# Patient Record
Sex: MALE | Race: WHITE | Employment: OTHER | URBAN - METROPOLITAN AREA
[De-identification: names, ages, dates, MRNs, and addresses within clinical notes are randomized per-mention and may not be internally consistent; named-entity substitution may affect disease eponyms.]

---

## 2017-04-25 ENCOUNTER — OFFICE VISIT (OUTPATIENT)
Dept: INTERNAL MEDICINE CLINIC | Age: 67
End: 2017-04-25

## 2017-04-25 VITALS
RESPIRATION RATE: 25 BRPM | WEIGHT: 183 LBS | HEIGHT: 69 IN | HEART RATE: 72 BPM | OXYGEN SATURATION: 97 % | TEMPERATURE: 97.9 F | SYSTOLIC BLOOD PRESSURE: 137 MMHG | BODY MASS INDEX: 27.11 KG/M2 | DIASTOLIC BLOOD PRESSURE: 77 MMHG

## 2017-04-25 DIAGNOSIS — Z00.00 WELL ADULT HEALTH CHECK: ICD-10-CM

## 2017-04-25 DIAGNOSIS — K27.9 PUD (PEPTIC ULCER DISEASE): ICD-10-CM

## 2017-04-25 DIAGNOSIS — Z12.5 SCREENING FOR PROSTATE CANCER: ICD-10-CM

## 2017-04-25 DIAGNOSIS — I70.213 ATHEROSCLEROSIS OF NATIVE ARTERY OF BOTH LOWER EXTREMITIES WITH INTERMITTENT CLAUDICATION (HCC): ICD-10-CM

## 2017-04-25 DIAGNOSIS — I10 ESSENTIAL HYPERTENSION: Primary | ICD-10-CM

## 2017-04-25 DIAGNOSIS — L40.9 PSORIASIS: ICD-10-CM

## 2017-04-25 DIAGNOSIS — Z12.11 SCREENING FOR COLON CANCER: ICD-10-CM

## 2017-04-25 DIAGNOSIS — J30.89 ENVIRONMENTAL AND SEASONAL ALLERGIES: ICD-10-CM

## 2017-04-25 DIAGNOSIS — Z76.89 ENCOUNTER TO ESTABLISH CARE: ICD-10-CM

## 2017-04-25 RX ORDER — PROPRANOLOL HYDROCHLORIDE 80 MG/1
80 TABLET ORAL 2 TIMES DAILY
COMMUNITY
End: 2017-04-25 | Stop reason: SDUPTHER

## 2017-04-25 RX ORDER — AZELASTINE HCL 205.5 UG/1
SPRAY NASAL 2 TIMES DAILY
COMMUNITY
End: 2017-04-25 | Stop reason: SDUPTHER

## 2017-04-25 RX ORDER — PROPRANOLOL HYDROCHLORIDE 40 MG/1
40 TABLET ORAL 2 TIMES DAILY
Qty: 60 TAB | Refills: 2 | Status: SHIPPED | OUTPATIENT
Start: 2017-04-25 | End: 2017-07-27 | Stop reason: SDUPTHER

## 2017-04-25 RX ORDER — CLOBETASOL PROPIONATE 0.5 MG/G
OINTMENT TOPICAL 2 TIMES DAILY
COMMUNITY
End: 2017-04-25 | Stop reason: SDUPTHER

## 2017-04-25 RX ORDER — AZELASTINE HCL 205.5 UG/1
1-2 SPRAY NASAL 2 TIMES DAILY
Qty: 1 BOTTLE | Refills: 5 | Status: SHIPPED | OUTPATIENT
Start: 2017-04-25 | End: 2017-06-16 | Stop reason: CLARIF

## 2017-04-25 RX ORDER — CLOBETASOL PROPIONATE 0.5 MG/G
OINTMENT TOPICAL
Qty: 30 G | Refills: 1 | Status: SHIPPED | OUTPATIENT
Start: 2017-04-25 | End: 2017-06-22 | Stop reason: SDUPTHER

## 2017-04-25 RX ORDER — PROPRANOLOL HYDROCHLORIDE 80 MG/1
80 TABLET ORAL 2 TIMES DAILY
Qty: 60 TAB | Refills: 2 | Status: SHIPPED | OUTPATIENT
Start: 2017-04-25 | End: 2017-07-27 | Stop reason: SDUPTHER

## 2017-04-25 RX ORDER — PANTOPRAZOLE SODIUM 40 MG/1
40 TABLET, DELAYED RELEASE ORAL DAILY
COMMUNITY
End: 2017-04-25 | Stop reason: SDUPTHER

## 2017-04-25 RX ORDER — PANTOPRAZOLE SODIUM 40 MG/1
40 TABLET, DELAYED RELEASE ORAL DAILY
Qty: 30 TAB | Refills: 2 | Status: SHIPPED | OUTPATIENT
Start: 2017-04-25 | End: 2017-07-27 | Stop reason: SDUPTHER

## 2017-04-25 NOTE — PATIENT INSTRUCTIONS
When you see GI, see if they can do colonoscopy for screening if they need to do upper endoscopy for history of ulcers, at the same time. Referrals processing  Please verify with your insurance IF you need referral authorization submitted. For insurance plans which require this, please follow the following steps. FAILURE TO DO SO MAY RESULT IN INABILITY TO SEE THE SPECIALIST YOU HAVE BEEN REFERRED TO.   1. Call and schedule appointment with specialist  2. Call our clinic and leave message with provider name, and date of appointment  3. We will then submit the referral to your insurance. This process takes 2-5 business days. Please let Hany Lee or Donya Rawls know once you have scheduled the appt. You can use one of the following Over The Counter (OTC) anti-histamines such as Zyrtec (cetirizine 10mg), Claritin (loratadine 10mg) or Allegra (fexofenadine 180mg) or generics. You would take one tab daily as needed for allergy symptoms, itching. If the dermatologist cannot see you with your insurance, call [facility] or [address] [place] [address] Dermatology or your insurance to find covered providers.

## 2017-04-25 NOTE — MR AVS SNAPSHOT
Visit Information Date & Time Provider Department Dept. Phone Encounter #  
 4/25/2017 10:30 AM Josephine Arana, [address] [place] [address] and Internal Medicine [phone] [id number] Follow-up Instructions Return in about 2 weeks (around 5/9/2017) for Blood Pressure follow-up; fasting labs tomorrow (with insurance card). Upcoming Health Maintenance Date Due DTaP/Tdap/Td series (1 - Tdap) 5/17/1969 FOBT Q 1 YEAR AGE 50-75 5/17/1998 ZOSTER VACCINE AGE 60> 5/17/2008 GLAUCOMA SCREENING Q2Y 5/17/2013 Pneumococcal 65+ Low/Medium Risk (1 of 2 - PCV13) 5/17/2013 MEDICARE YEARLY EXAM 5/17/2013 INFLUENZA AGE 9 TO ADULT 8/1/2016 Allergies as of 4/25/2017  Review Complete On: 4/25/2017 By: Josephine Arana MD  
 No Known Allergies Current Immunizations  Never Reviewed No immunizations on file. Not reviewed this visit You Were Diagnosed With   
  
 Codes Comments Essential hypertension    -  Primary ICD-10-CM: I10 
ICD-9-CM: 401.9 Screening for prostate cancer     ICD-10-CM: Z12.5 ICD-9-CM: V76.44 Well adult health check     ICD-10-CM: Z00.00 ICD-9-CM: V70.0 Encounter to establish care     ICD-10-CM: Z76.89 
ICD-9-CM: V65.8 PUD (peptic ulcer disease)     ICD-10-CM: K27.9 ICD-9-CM: 533.90 Screening for colon cancer     ICD-10-CM: Z12.11 ICD-9-CM: V76.51 Environmental and seasonal allergies     ICD-10-CM: J30.89 ICD-9-CM: 477.8 Psoriasis     ICD-10-CM: L40.9 ICD-9-CM: 696.1 Atherosclerosis of native artery of both lower extremities with intermittent claudication ([facility] [address]     ICD-10-CM: S46.067 ICD-9-CM: 440.21 Vitals BP Pulse Temp Resp Height(growth percentile) Weight(growth percentile) 154/83 (BP 1 Location: Left arm, BP Patient Position: Sitting) 72 97.9 °F (36.6 °C) (Oral) 25 5' 8.5\" (1.74 m) 183 lb (83 kg) SpO2 BMI Smoking Status 97% 27.42 kg/m2 Heavy Tobacco Smoker BMI and BSA Data Body Mass Index Body Surface Area  
 27.42 kg/m 2 2 m 2 Preferred Pharmacy Pharmacy Name Phone [facility]/PHARMACY #[id number] Artur SWETHA Blackwood/ Matthew Wong [facility] [phone] Your Updated Medication List  
  
   
This list is accurate as of: 4/25/17 11:55 AM.  Always use your most recent med list.  
  
  
  
  
 Azelastine 0.15 % (205.5 mcg) nasal spray Commonly known as:  ASTEPRO  
1-2 Sprays by Both Nostrils route two (2) times a day. clobetasol 0.05 % ointment Commonly known as:  Jessee Limes Apply  to affected area two (2) times daily as needed for Skin Irritation. pantoprazole 40 mg tablet Commonly known as:  PROTONIX Take 1 Tab by mouth daily. * propranolol 80 mg tablet Commonly known as:  INDERAL Take 1 Tab by mouth two (2) times a day. Take with 40mg tab, for 120mg two times daily. * propranolol 40 mg tablet Commonly known as:  INDERAL Take 1 Tab by mouth two (2) times a day. Take with 80mg tab, for 120mg two times daily. * Notice: This list has 2 medication(s) that are the same as other medications prescribed for you. Read the directions carefully, and ask your doctor or other care provider to review them with you. Prescriptions Sent to Pharmacy Refills  
 propranolol (INDERAL) 80 mg tablet 2 Sig: Take 1 Tab by mouth two (2) times a day. Take with 40mg tab, for 120mg two times daily. Class: Normal  
 Pharmacy: [facility]/pharmacy #[id number]  [address] Ph #: [phone] Route: Oral  
 propranolol (INDERAL) 40 mg tablet 2 Sig: Take 1 Tab by mouth two (2) times a day. Take with 80mg tab, for 120mg two times daily. Class: Normal  
 Pharmacy: [facility]/pharmacy #[id number]  [address] Ph #: [phone] Route: Oral  
 pantoprazole (PROTONIX) 40 mg tablet 2 Sig: Take 1 Tab by mouth daily. Class: Normal  
 Pharmacy: [facility]/pharmacy #[id number] Robert  Ph #: [phone] Route: Oral  
 Azelastine (ASTEPRO) 0.15 % (205.5 mcg) nasal spray 5 Si-2 Sprays by Both Nostrils route two (2) times a day. Class: Normal  
 Pharmacy: [facility]/pharmacy #[id number] Robert  Ph #: [phone] Route: Both Nostrils  
 clobetasol (TEMOVATE) 0.05 % ointment 1 Sig: Apply  to affected area two (2) times daily as needed for Skin Irritation. Class: Normal  
 Pharmacy: [facility]/pharmacy #[id number] - [address] Ph #: [phone] Route: Topical  
  
We Performed the Following CBC WITH AUTOMATED DIFF [05143 CPT(R)] HEMOGLOBIN A1C WITH EAG [75896 CPT(R)] LIPID PANEL [36652 CPT(R)] METABOLIC PANEL, COMPREHENSIVE [50203 CPT(R)] PSA W/ REFLX FREE PSA [22273 CPT(R)] REFERRAL TO DERMATOLOGY [REF19 Custom] Comments:  
 Please evaluate patient for psoriasis. REFERRAL TO GASTROENTEROLOGY [NKQ46 Custom] Comments:  
 Please evaluate patient for PUD, repeat endoscopy and screening colonoscopy. REFERRAL TO VASCULAR SURGERY [[id number] Custom] Comments:  
 Please evaluate patient for bilat LE claudication. Follow-up Instructions Return in about 2 weeks (around 2017) for Blood Pressure follow-up; fasting labs tomorrow (with insurance card). Referral Information Referral ID Referred By Referred To  
  
 [id number] Maris PALOMINO MD   
   [address] [place] [address] [address] [facility], [address] Phone: [phone] Fax: [phone] Visits Status Start Date End Date 1 New Request 17 If your referral has a status of pending review or denied, additional information will be sent to support the outcome of this decision. Referral ID Referred By Referred To  
 [id number] Vania Deluna MD  
   [address] Phone: [phone] Fax: [phone] Visits Status Start Date End Date 1 New Request 4/25/17 4/25/18 If your referral has a status of pending review or denied, additional information will be sent to support the outcome of this decision. Referral ID Referred By Referred To  
 [id number] Shira Cole MD  
   [address] [place] [address] 3 [address] Phone: [phone] Fax: [phone] Visits Status Start Date End Date 1 New Request 4/25/17 4/25/18 If your referral has a status of pending review or denied, additional information will be sent to support the outcome of this decision. Patient Instructions When you see GI, see if they can do colonoscopy for screening if they need to do upper endoscopy for history of ulcers, at the same time. Referrals processing Please verify with your insurance IF you need referral authorization submitted. For insurance plans which require this, please follow the following steps. FAILURE TO DO SO MAY RESULT IN INABILITY TO SEE THE SPECIALIST YOU HAVE BEEN REFERRED TO.  
1. Call and schedule appointment with specialist 
2. Call our clinic and leave message with provider name, and date of appointment 3. We will then submit the referral to your insurance. This process takes 2-5 business days. Please let Alen Cheung or Luci Prince know once you have scheduled the appt. You can use one of the following Over The Counter (OTC) anti-histamines such as Zyrtec (cetirizine 10mg), Claritin (loratadine 10mg) or Allegra (fexofenadine 180mg) or generics. You would take one tab daily as needed for allergy symptoms, itching.  
 
 
If the dermatologist cannot see you with your insurance, call [facility] or [facility] or your insurance to find covered providers. Introducing [facility] & HEALTH SERVICES! Bernie Garcia introduces Att[employer] patient portal. Now you can access parts of your medical record, email your doctor's office, and request medication refills online. 1. In your internet browser, go to [email or web address] [email or web address]/Results [email or web address] 2. Click on the First Time User? Click Here link in the Sign In box. You will see the New Member Sign Up page. 3. Enter your Attensa Access Code exactly as it appears below. You will not need to use this code after youve completed the sign-up process. If you do not sign up before the expiration date, you must request a new code. · Attensa Access Code: [email or web address] Expires: 7/24/2017 10:23 AM 
 
4. Enter the last four digits of your Social Security Number (xxxx) and Date of Birth (mm/dd/yyyy) as indicated and click Submit. You will be taken to the next sign-up page. 5. Create a Attensa ID. This will be your Attensa login ID and cannot be changed, so think of one that is secure and easy to remember. 6. Create a Attensa password. You can change your password at any time. 7. Enter your Password Reset Question and Answer. This can be used at a later time if you forget your password. 8. Enter your e-mail address. You will receive e-mail notification when new information is available in [address] 9. Click Sign Up. You can now view and download portions of your medical record. 10. Click the Download Summary menu link to download a portable copy of your medical information. If you have questions, please visit the Frequently Asked Questions section of the Attensa website. Remember, Attensa is NOT to be used for urgent needs. For medical emergencies, dial 911. Now available from your iPhone and Android! Please provide this summary of care documentation to your next provider. Your primary care clinician is listed as [facility]. If you have any questions after today's visit, please call [phone].

## 2017-04-25 NOTE — PROGRESS NOTES
HISTORY OF PRESENT ILLNESS  Bard Stuart is a 74 y.o. male. HPI  Here to establish care and for eval following. Chief Complaint   Patient presents with   [facility] Establish Care     bilaterally leg pain muscles feel as there cramping and tightning; cant walk far without stopping     Notes last provider Dr. Kendrick Marmolejo near [place]. He was in skilled nursing for 2yrs and out about one month ago. He is living here now. He notes still feels woozy with BP med. [facility] only refilled meds when he left there. Has only 3 propranolol remaining. Has on protonix 40mg remaining. He notes on Medicaid and SSI. Notes no labs in skilled nursing. He notes worsening bilat claudication symptoms since out of skilled nursing. He thought related to deconditioning, but out of skilled nursing with more exercise worsening. He notes bilat calf pain with walking. Resolves when stops, but with walking again, recurs. No pain at rest.    Notes feet feel cold bilat all the time. No history of foot ulcers, foot or vascular surgery. No MI/CAD history noted. Has not seen derm in past.  Notes no prior colonoscopy or GI eval for GERD. Pt and family interested in doing at this time. Reviewed labs with pt/family at visit. ROS  Complete ROS negative except as indicated in note. Blood pressure 154/83, pulse 72, temperature 97.9 °F (36.6 °C), temperature source Oral, resp. rate 25, height 5' 8.5\" (1.74 m), weight 183 lb (83 kg), SpO2 97 %. Physical Exam   Constitutional: He appears well-developed and well-nourished. No distress. HENT:   Head: Normocephalic and atraumatic. Eyes: Conjunctivae are normal. Right eye exhibits no discharge. Left eye exhibits no discharge. No scleral icterus. Neck: Normal range of motion. Neck supple. Cardiovascular: Normal rate, regular rhythm, normal heart sounds and intact distal pulses. Exam reveals no gallop and no friction rub. No murmur heard.   Pulmonary/Chest: Effort normal and breath sounds normal. No respiratory distress. He has no wheezes. He has no rales. Abdominal: Soft. Bowel sounds are normal. He exhibits no distension. There is no tenderness. Musculoskeletal: He exhibits no edema or tenderness. Lymphadenopathy:     He has no cervical adenopathy. Neurological: He is alert. He exhibits normal muscle tone. Coordination normal.   Skin: Skin is warm. No rash noted. He is not diaphoretic. No erythema. No pallor. Psychiatric: He has a normal mood and affect. His behavior is normal. Judgment and thought content normal.   Skin/extremity exam:  Multiple areas of rash LE's bilat --c/w psoriasis. PT not palpable bilat. DP 1/2--palpable bilat. Feet cold bilat. Healing ulcer ~1cm, anterior ankle right. ASSESSMENT and PLAN    ICD-10-CM ICD-9-CM    1. Essential hypertension Z55 385.4 METABOLIC PANEL, COMPREHENSIVE   2. Screening for prostate cancer Z12.5 V76.44    3. Well adult health check Z00.00 V70.0 CBC WITH AUTOMATED DIFF      METABOLIC PANEL, COMPREHENSIVE      LIPID PANEL      HEMOGLOBIN A1C WITH EAG      PSA W/ REFLX FREE PSA   4. Encounter to establish care Z76.89 V65.8    5. PUD (peptic ulcer disease) K27.9 533.90 pantoprazole (PROTONIX) 40 mg tablet   6. Screening for colon cancer Z12.11 V76.51 REFERRAL TO GASTROENTEROLOGY   7. Environmental and seasonal allergies J30.89 477.8 Azelastine (ASTEPRO) 0.15 % (205.5 mcg) nasal spray   8. Psoriasis L40.9 696.1 clobetasol (TEMOVATE) 0.05 % ointment      REFERRAL TO DERMATOLOGY   9. Atherosclerosis of native artery of both lower extremities with intermittent claudication ([facility] [address] I70.213 440.21 REFERRAL TO VASCULAR SURGERY       Referrals reviewed with pt & family at visit. Follow-up Disposition:  Return in about 2 weeks (around 5/9/2017) for Blood Pressure follow-up; fasting labs tomorrow (with insurance card).   lab results and schedule of future lab studies reviewed with patient  reviewed medications and side effects in detail    For additional documentation of information and/or recommendations discussed this visit, please see notes in instructions. Plan and evaluation (above) reviewed with pt at visit  Patient voiced understanding of plan and provided with time to ask/review questions. After Visit Summary (AVS) provided to pt after visit with additional instructions as needed/reviewed.

## 2017-04-25 NOTE — PROGRESS NOTES
Rm#[id number]  Chief Complaint   Patient presents with   [facility].     bilaterally leg pain muscles feel as there cramping and tightning; cant walk far without stopping   allergies nasal drainage   1. Have you been to the ER, urgent care clinic since your last visit? Hospitalized since your last visit? No    2. Have you seen or consulted any other health care providers outside of the [address] [place] [address] since your last visit? Include any pap smears or colon screening. Dr. Stella Thompson transfering   Health Maintenance Due   Topic Date Due    DTaP/Tdap/Td series (1 - Tdap) 05/17/1969    FOBT Q 1 YEAR AGE 50-75  05/17/1998    ZOSTER VACCINE AGE 60>  05/17/2008    GLAUCOMA SCREENING Q2Y  05/17/2013    Pneumococcal 65+ Low/Medium Risk (1 of 2 - PCV13) 05/17/2013    MEDICARE YEARLY EXAM  05/17/2013    INFLUENZA AGE 9 TO ADULT  08/01/2016     PHQ 2 / 9, over the last two weeks 4/25/2017   Little interest or pleasure in doing things Not at all   Feeling down, depressed or hopeless Nearly every day   Total Score PHQ 2 3     Fall Risk Assessment, last 12 mths 4/25/2017   Able to walk? Yes   Fall in past 12 months?  No

## 2017-05-29 ENCOUNTER — TELEPHONE (OUTPATIENT)
Dept: INTERNAL MEDICINE CLINIC | Age: 67
End: 2017-05-29

## 2017-05-29 NOTE — TELEPHONE ENCOUNTER
Please contact pt to schedule follow-up appt and for fasting labs. He has had multiple no-show/re-schedules already, so clarify if this location is best for his continued care, or if another [facility] location is closer.

## 2017-05-30 NOTE — TELEPHONE ENCOUNTER
Letter sent out to for patient to contact our office to schedule an appointment or if [facility] can better serve the patient better

## 2017-06-05 NOTE — TELEPHONE ENCOUNTER
----- Message from Usama Gilbert sent at 6/5/2017  2:46 PM EDT -----  Regarding: Dr. Minh Rizo   Pt would like to know if he can be referred to another doctor due to the doctor he was referred to for his psoriasis does not accept his insurance. Pt best contact number is [phone].

## 2017-06-08 ENCOUNTER — TELEPHONE (OUTPATIENT)
Dept: INTERNAL MEDICINE CLINIC | Age: 67
End: 2017-06-08

## 2017-06-08 ENCOUNTER — OFFICE VISIT (OUTPATIENT)
Dept: SURGERY | Age: 67
End: 2017-06-08

## 2017-06-08 VITALS
DIASTOLIC BLOOD PRESSURE: 78 MMHG | SYSTOLIC BLOOD PRESSURE: 144 MMHG | HEIGHT: 69 IN | BODY MASS INDEX: 26.33 KG/M2 | WEIGHT: 177.8 LBS | OXYGEN SATURATION: 96 % | HEART RATE: 64 BPM

## 2017-06-08 DIAGNOSIS — I73.9 CLAUDICATION OF BOTH LOWER EXTREMITIES (HCC): Primary | ICD-10-CM

## 2017-06-08 DIAGNOSIS — I73.9 PAD (PERIPHERAL ARTERY DISEASE) (HCC): ICD-10-CM

## 2017-06-08 NOTE — PROGRESS NOTES
The patient is a 69 y.o. man referred by Diana Valencia MD regarding calf pain with walking. The patient reports he could walk about 50 yards before he has to stop because of bilateral calf pain. The pain is equal on both sides. If he rests for a few minutes, he can then resume walking. He reports his feet feel cold frequently but he does not really have pain in either foot and is not kept awake at night by pain in either foot. He has no foot ulcers. The patient has no history of vascular intervention. He does smoke one pack per day and has smoked for about 50 years. The patient has a history of arthritis, headache, hypertension, stomach ulcer. He does use alcohol. The patient denies history of anginal chest pain, irregular heart beat, history of MI or coronary intervention. He reports he will have some shortness of breath with marked exertion but will not feel shortness of breath with normal walking. The patient has no history of diabetes. Physical Examination:   GENERAL:  Alert man in no acute distress. VITAL SIGNS: BP:  144/78. P:  64.  O2 saturation on room air 96%. WT: 177 lb. HEAD/NECK:  No jaundice, mass or thyromegaly. LUNGS:  Clear bilaterally but somewhat reduced without wheeze, rhonchi or rales. Ceclia Rafael HEART:  Regular without murmur, gallop or rub. ABDOMEN: Soft, nontender with no mass being palpated. No hernias are noted. EXTREMITIES:  No edema. There are no foot ulcers. There are bilateral 2+ femoral pulses. No pulses are felt below that level. Doppler signals are biphasic in the posterior tibials bilaterally. Dorsalis pedis signals are not heard. NEURO:  Patient is alert and oriented and moves all extremities equally. Facial movement is symmetrical. Speech was normal.      The patient does have evidence of peripheral artery disease. It appears that his symptoms are limited to claudication.   I will arrange for the patient to have noninvasive vascular lab testing. I have asked the patient to see me back in the office after that for final recommendations. I did strongly recommend the patient stop smoking completely. Rationale for quitting smoking was reviewed. Aids in quitting smoking were reviewed. The patient will see me again in the office following his noninvasive studies. Final Diagnosis:  Peripheral artery disease with bilateral calf claudication. MedDATA/gwo     cc: Heladio Keene MD          Total Evaluation and Management time utilized (excluding any procedure time)  was 30 minutes, with 55 % in counseling and/or coordination of care.     Dory Powell MD

## 2017-06-12 ENCOUNTER — TELEPHONE (OUTPATIENT)
Dept: INTERNAL MEDICINE CLINIC | Age: 67
End: 2017-06-12

## 2017-06-12 NOTE — TELEPHONE ENCOUNTER
Received a fax from [phone] stating that they would cover Patanase nasal spray without a PA, PA denied for Azelastine   Please send new script to pharmacy

## 2017-06-16 RX ORDER — OLOPATADINE HYDROCHLORIDE 665 UG/1
2 SPRAY NASAL
Qty: 1 BOTTLE | Refills: 5 | Status: SHIPPED | OUTPATIENT
Start: 2017-06-16 | End: 2020-02-19 | Stop reason: CLARIF

## 2017-06-16 NOTE — TELEPHONE ENCOUNTER
Please advise alternative to Asteline nasal sent to pharmacy. Insurance covered Patanase instead. Medication is dosed same as Asteline nasal and same mechanism of action (nasal antihistamine).

## 2017-06-22 ENCOUNTER — TELEPHONE (OUTPATIENT)
Dept: INTERNAL MEDICINE CLINIC | Age: 67
End: 2017-06-22

## 2017-06-22 ENCOUNTER — LAB ONLY (OUTPATIENT)
Dept: INTERNAL MEDICINE CLINIC | Age: 67
End: 2017-06-22

## 2017-06-22 ENCOUNTER — PATIENT OUTREACH (OUTPATIENT)
Dept: INTERNAL MEDICINE CLINIC | Age: 67
End: 2017-06-22

## 2017-06-22 DIAGNOSIS — L40.9 PSORIASIS: ICD-10-CM

## 2017-06-22 NOTE — TELEPHONE ENCOUNTER
Please place a referral for Dr Vinod Callahan MD, Dermatologist , [address] Ph [phone],      Patient called and stated he needed a Dermatologist that would take medicaid.

## 2017-06-22 NOTE — PROGRESS NOTES
Patient request assistance with follow up appointments with Dr. Henry Becerra and transportation. Patient was schedule for follow up with Dr. Henry Becerra today at 11:00 am. Unable to attend appointment d/t transportation issues. Advised has transportation through [facility]. Called to schedule PVR on July 6, 2017 at 10:30 am at [facility]. Scheduled follow up with Dr. Henry Becerra on July 13, 2017 at 10:20 am. Scheduled [facility]  for both appointments with return times. Patient given contact information for [address] [place] [address],[address] and Dr. Debra Davis' office for any questions or concerns.

## 2017-06-22 NOTE — TELEPHONE ENCOUNTER
Pt states that Dr. Miguel Villarreal referred him to a dermatologist, but they do not except his insurance. Please call him with some other specialty offices that he could try.

## 2017-06-23 DIAGNOSIS — L40.9 PSORIASIS: ICD-10-CM

## 2017-06-23 LAB
ALBUMIN SERPL-MCNC: 4 G/DL (ref 3.6–4.8)
ALBUMIN/GLOB SERPL: 1.5 {RATIO} (ref 1.2–2.2)
ALP SERPL-CCNC: 67 IU/L (ref 39–117)
ALT SERPL-CCNC: 11 IU/L (ref 0–44)
AST SERPL-CCNC: 12 IU/L (ref 0–40)
BASOPHILS # BLD AUTO: 0.1 X10E3/UL (ref 0–0.2)
BASOPHILS NFR BLD AUTO: 1 %
BILIRUB SERPL-MCNC: 0.4 MG/DL (ref 0–1.2)
BUN SERPL-MCNC: 12 MG/DL (ref 8–27)
BUN/CREAT SERPL: 13 (ref 10–24)
CALCIUM SERPL-MCNC: 9.3 MG/DL (ref 8.6–10.2)
CHLORIDE SERPL-SCNC: 99 MMOL/L (ref 96–106)
CHOLEST SERPL-MCNC: 202 MG/DL (ref 100–199)
CO2 SERPL-SCNC: 22 MMOL/L (ref 18–29)
CREAT SERPL-MCNC: 0.91 MG/DL (ref 0.76–1.27)
EOSINOPHIL # BLD AUTO: 0.2 X10E3/UL (ref 0–0.4)
EOSINOPHIL NFR BLD AUTO: 3 %
ERYTHROCYTE [DISTWIDTH] IN BLOOD BY AUTOMATED COUNT: 12.9 % (ref 12.3–15.4)
EST. AVERAGE GLUCOSE BLD GHB EST-MCNC: 111 MG/DL
GLOBULIN SER CALC-MCNC: 2.7 G/DL (ref 1.5–4.5)
GLUCOSE SERPL-MCNC: 105 MG/DL (ref 65–99)
HBA1C MFR BLD: 5.5 % (ref 4.8–5.6)
HCT VFR BLD AUTO: 44.7 % (ref 37.5–51)
HDLC SERPL-MCNC: 66 MG/DL
HGB BLD-MCNC: 14.2 G/DL (ref 12.6–17.7)
IMM GRANULOCYTES # BLD: 0 X10E3/UL (ref 0–0.1)
IMM GRANULOCYTES NFR BLD: 0 %
LDLC SERPL CALC-MCNC: 109 MG/DL (ref 0–99)
LYMPHOCYTES # BLD AUTO: 2.1 X10E3/UL (ref 0.7–3.1)
LYMPHOCYTES NFR BLD AUTO: 25 %
MCH RBC QN AUTO: 29.3 PG (ref 26.6–33)
MCHC RBC AUTO-ENTMCNC: 31.8 G/DL (ref 31.5–35.7)
MCV RBC AUTO: 92 FL (ref 79–97)
MONOCYTES # BLD AUTO: 0.8 X10E3/UL (ref 0.1–0.9)
MONOCYTES NFR BLD AUTO: 10 %
NEUTROPHILS # BLD AUTO: 5 X10E3/UL (ref 1.4–7)
NEUTROPHILS NFR BLD AUTO: 61 %
PLATELET # BLD AUTO: 330 X10E3/UL (ref 150–379)
POTASSIUM SERPL-SCNC: 4.2 MMOL/L (ref 3.5–5.2)
PROT SERPL-MCNC: 6.7 G/DL (ref 6–8.5)
PSA SERPL-MCNC: 1 NG/ML (ref 0–4)
RBC # BLD AUTO: 4.85 X10E6/UL (ref 4.14–5.8)
REFLEX CRITERIA: NORMAL
SODIUM SERPL-SCNC: 139 MMOL/L (ref 134–144)
TRIGL SERPL-MCNC: 137 MG/DL (ref 0–149)
VLDLC SERPL CALC-MCNC: 27 MG/DL (ref 5–40)
WBC # BLD AUTO: 8.1 X10E3/UL (ref 3.4–10.8)

## 2017-06-23 NOTE — TELEPHONE ENCOUNTER
----- Message from Maryjo Rodriguez sent at 6/23/2017 11:02 AM EDT -----  Regarding: Dr. Cong Lopez  Pt is requesting a Rx for his psoriasis ointment be sent to [facility] pharmacy in [facility], [place]. Pt stated, he asked the doctor to send it over yesterday, but he called the pharmacy and they have not received anything. Best contact number [phone].

## 2017-06-27 RX ORDER — CLOBETASOL PROPIONATE 0.5 MG/G
OINTMENT TOPICAL
Qty: 30 G | Refills: 1 | Status: SHIPPED | OUTPATIENT
Start: 2017-06-27 | End: 2017-07-21 | Stop reason: SDUPTHER

## 2017-06-27 NOTE — TELEPHONE ENCOUNTER
Refill request(s) approved--clobetasol 0.05% ointment. Please clarify if/when pt seeing dermatology.

## 2017-06-27 NOTE — TELEPHONE ENCOUNTER
Patient already had a refill for ointment and was contacted. Sent a request for a new dermatologist that would take medicaid on 6/22/2017    Please place a referral for Dr Ranjith Morris MD, Dermatologist , [address]  Ph [phone],       I called the above practice and they do take medicaid

## 2017-07-06 ENCOUNTER — HOSPITAL ENCOUNTER (OUTPATIENT)
Dept: VASCULAR SURGERY | Age: 67
Discharge: HOME | End: 2017-07-06
Attending: SURGERY
Payer: MEDICAID

## 2017-07-06 DIAGNOSIS — I73.9 CLAUDICATION OF BOTH LOWER EXTREMITIES (HCC): ICD-10-CM

## 2017-07-06 PROCEDURE — 93923 UPR/LXTR ART STDY 3+ LVLS: CPT

## 2017-07-06 NOTE — PROGRESS NOTES
[facility]  Preliminary Report:  PVR Lower Extremity    Pressure (mmHg) Right    Left    Brachial:  148   151  High Thigh:  163   159  Low Thigh:  115   122  Calf:   102   91  Ankle PTA:  106   90  Ankle DPA:  87   94  Great Toe:  53   43    Waveform:  Right   Left  CFA:   Triphasic  Triphasic  Popliteal:  Biphasic  Biphasic  PTA:   Biphasic  Biphasic  DPA: Biphasic  Monophasic  Great Toe:  Pulsatile  Pulsatile    Right JAGRUTI:   0.70  Left JAGRUTI 0.62  Right TBI: 0.35  Left TBI 0.28      Final report to follow.     CFA = Common Femoral Artery  PTA = Posterior Tibial Artery  DPA = Dorsalis Pedis Artery  JAGRUTI = Ankle Brachial Index  TBI = Toe Brachial Index

## 2017-07-06 NOTE — PROCEDURES
[facility]  *** FINAL REPORT ***    Name: Osbaldo Kingsley  MRN: [id number]    Outpatient  : 17 May 1950  HIS Order #: [id number]  [id number] [facility] Visit #: [id number]  Date: 2017    TYPE OF TEST: Peripheral Arterial Testing    REASON FOR TEST  Claudication (both sides)    Right Leg  Segmentals: Abnormal                     mmHg  Brachial         148  High thigh       163  Low thigh        115  Calf             102  Posterior tibial 106  Dorsalis pedis    87  Peroneal  Metatarsal  Toe pressure      53  Doppler:    Normal  Ankle/Brachial: 0.70    Site of occlusive disease:-  femoral and popliteal segments    Left Leg  Segmentals: Abnormal                     mmHg  Brachial         151  High thigh       159  Low thigh        122  Calf              91  Posterior tibial  90  Dorsalis pedis    94  Peroneal  Metatarsal  Toe pressure      43  Doppler:    Abnormal  Ankle/Brachial: 0.62    Site of occlusive disease:-  femoral and popliteal segments    INTERPRETATION/FINDINGS  PROCEDURE:  Multi-level lower extremity arterial segmental pressures,  CW Doppler waveforms and digital PPG waveforms were performed. 1. Moderate peripheral arterial disease indicated at rest in the right   leg. 2. Disease is located in the femoral and popliteal segments on the  right side. 3. Moderate peripheral arterial disease indicated at rest in the left  leg. 4. Abnormality of the left dorsalis pedis continuous wave Doppler  signals noted. 5. Disease is located in the femoral and popliteal segments on the  left side. 6. The right ankle/brachial index is 0.70 and the left ankle/brachial  index is 0.62.  7. The right great toe/brachial index is 0.35 and the left great  toe/brachial index is 0.28. ADDITIONAL COMMENTS    I have personally reviewed the data relevant to the interpretation of  this  study. TECHNOLOGIST: Obey Goldstein RVT, RDMS  Signed: 2017 02:14 PM    PHYSICIAN: Clay Essex B. Noma Fleischer, MD  Signed: 07/06/2017 04:48 PM

## 2017-07-18 DIAGNOSIS — L40.9 PSORIASIS: ICD-10-CM

## 2017-07-18 NOTE — TELEPHONE ENCOUNTER
Last time I clarified the dermatologist he stated they would not take his insurance    Please place a referral for Dr Brad Caro MD, Dermatologist , [address]  Ph [phone],  they will take his insurance

## 2017-07-18 NOTE — LETTER
7/19/2017 10:03 AM 
 
Mr. Amrit Macdonald [address] [facility] [address] Dear Mr. Vaughan: We have been trying to get in touch with you by phone. You recently asked for a refill for   
Clobetasol 60gm. A 30gm tube with one refill was sent to pharmacy ~3wks ago--should not be out already. Please follow up with a dermatologist to further assess you at this time. I have included a new referral with a  dermatologist that should take your insurance. Please call and make your appointment.  
 
 
Sincerely, 
 
 
Mendoza Garcia MD

## 2017-07-19 RX ORDER — CLOBETASOL PROPIONATE 0.5 MG/G
OINTMENT TOPICAL
Qty: 30 G | Refills: 1 | OUTPATIENT
Start: 2017-07-19

## 2017-07-19 NOTE — TELEPHONE ENCOUNTER
When is he seeing Dermatology? Referral placed. Clobetasol 60gm (30gm tube with one refill sent to pharmacy ~3wks ago--should not be out now.

## 2017-07-20 ENCOUNTER — OFFICE VISIT (OUTPATIENT)
Dept: SURGERY | Age: 67
End: 2017-07-20

## 2017-07-20 VITALS
OXYGEN SATURATION: 96 % | BODY MASS INDEX: 25.45 KG/M2 | RESPIRATION RATE: 20 BRPM | HEIGHT: 69 IN | HEART RATE: 64 BPM | DIASTOLIC BLOOD PRESSURE: 82 MMHG | SYSTOLIC BLOOD PRESSURE: 142 MMHG | WEIGHT: 171.8 LBS

## 2017-07-20 DIAGNOSIS — I73.9 PAD (PERIPHERAL ARTERY DISEASE) (HCC): Primary | ICD-10-CM

## 2017-07-20 DIAGNOSIS — I73.9 CLAUDICATION OF BOTH LOWER EXTREMITIES (HCC): ICD-10-CM

## 2017-07-20 NOTE — PROGRESS NOTES
The patient reports he continues to be able to walk about 50 yard before he has to stop because of leg discomfort. He has no resting symptoms. The patient had noninvasive arterial testing which showed ankle arm index 0.70 on the right and 0.62 on the left. Noninvasive studies are consistent with moderate arterial disease which would be expected to produce claudication. Perfusion at rest is acceptable. The patient reports he has cut back on smoking but is unable to stop completely. I have recommended he speak with Kiesha Key MD regarding aids in quitting smoking. Cessation of smoking will be very important in terms of minimizing risk of progression of his peripheral artery disease. I explained to the patient that I would not normally recommend invasive intervention for claudication symptoms at 50 yards. Cessation of smoking and deliberately walking to push the claudication distance will be first line treatment. If the patient's symptoms worsen or if he should develop ischemic rest pain or tissue loss, then I would recommend consideration for angiography and possible intervention. Final Diagnosis:  Peripheral artery disease with claudication. MedDATA/gwo     cc: Kiesha Key MD     Total Evaluation and Management time utilized (excluding any procedure time)  was 16 minutes, with 100 % in counseling and/or coordination of care.     Heraclio Dyson MD

## 2017-07-20 NOTE — MR AVS SNAPSHOT
Visit Information Date & Time Provider Department Dept. Phone Encounter #  
 7/20/2017  2:00 PM Felicitas Modi MD [facility] [address] [phone] [id number] Your Appointments 7/27/2017 11:45 AM  
ACUTE CARE with Reji Zelaya MD  
[facility] [address]) Appt Note: Psorisis flaring up [address] [facility] [address] [facility] [address]  
[address] [facility] [address] Upcoming Health Maintenance Date Due Hepatitis C Screening 1950 DTaP/Tdap/Td series (1 - Tdap) 5/17/1969 FOBT Q 1 YEAR AGE 50-75 5/17/1998 ZOSTER VACCINE AGE 60> 3/17/2008 GLAUCOMA SCREENING Q2Y 5/17/2013 Pneumococcal 65+ Low/Medium Risk (1 of 2 - PCV13) 5/17/2013 MEDICARE YEARLY EXAM 5/17/2013 INFLUENZA AGE 9 TO ADULT 8/1/2017 Allergies as of 7/20/2017  Review Complete On: 7/20/2017 By: Felicitas Modi MD  
 No Known Allergies Current Immunizations  Never Reviewed No immunizations on file. Not reviewed this visit You Were Diagnosed With   
  
 Codes Comments PAD (peripheral artery disease) (HCC)    -  Primary ICD-10-CM: I73.9 ICD-9-CM: 443.9 Claudication of both lower extremities ([facility] [address]     ICD-10-CM: I73.9 ICD-9-CM: 443. 9 Vitals BP Pulse Resp Height(growth percentile) Weight(growth percentile) SpO2  
 142/82 (BP 1 Location: Left arm, BP Patient Position: Sitting) 64 20 5' 8.5\" (1.74 m) 171 lb 12.8 oz (77.9 kg) 96% BMI Smoking Status 25.74 kg/m2 Heavy Tobacco Smoker BMI and BSA Data Body Mass Index Body Surface Area 25.74 kg/m 2 1.94 m 2 Preferred Pharmacy Pharmacy Name Phone [id number] - [address]/ [address] [place] [phone] Your Updated Medication List  
  
   
 This list is accurate as of: 7/20/17  2:51 PM.  Always use your most recent med list.  
  
  
  
  
 clobetasol 0.05 % ointment Commonly known as:  Gladystine Dryer Apply  to affected area two (2) times daily as needed for Skin Irritation. olopatadine 0.6 % Spry Commonly known as:  PATANASE 2 Squirts by Both Nostrils route two (2) times daily as needed. Use instead of Azelastine nasal spray--not covered. pantoprazole 40 mg tablet Commonly known as:  PROTONIX Take 1 Tab by mouth daily. * propranolol 80 mg tablet Commonly known as:  INDERAL Take 1 Tab by mouth two (2) times a day. Take with 40mg tab, for 120mg two times daily. * propranolol 40 mg tablet Commonly known as:  INDERAL Take 1 Tab by mouth two (2) times a day. Take with 80mg tab, for 120mg two times daily. * Notice: This list has 2 medication(s) that are the same as other medications prescribed for you. Read the directions carefully, and ask your doctor or other care provider to review them with you. Introducing [facility]! [facility] introduces [employer] patient portal. Now you can access parts of your medical record, email your doctor's office, and request medication refills online. 1. In your internet browser, go to [email or web address] 2. Click on the First Time User? Click Here link in the Sign In box. You will see the New Member Sign Up page. 3. Enter your [employer] Access Code exactly as it appears below. You will not need to use this code after youve completed the sign-up process. If you do not sign up before the expiration date, you must request a new code. · [employer] Access Code: [email or web address] Expires: 7/24/2017 10:23 AM 
 
4. Enter the last four digits of your Social Security Number (xxxx) and Date of Birth (mm/dd/yyyy) as indicated and click Submit. You will be taken to the next sign-up page. 5. Create a [employer] ID. This will be your [employer] login ID and cannot be changed, so think of one that is secure and easy to remember. 6. Create a [employer] password. You can change your password at any time. 7. Enter your Password Reset Question and Answer. This can be used at a later time if you forget your password. 8. Enter your e-mail address. You will receive e-mail notification when new information is available in [address] 9. Click Sign Up. You can now view and download portions of your medical record. 10. Click the Download Summary menu link to download a portable copy of your medical information. If you have questions, please visit the Frequently Asked Questions section of the [employer] website. Remember, [employer] is NOT to be used for urgent needs. For medical emergencies, dial 911. Now available from your iPhone and Android! Please provide this summary of care documentation to your next provider. Your primary care clinician is listed as [address]. If you have any questions after today's visit, please call [phone].

## 2017-07-21 NOTE — TELEPHONE ENCOUNTER
Did inform patient that he needed to make appt with a dermatologsit asked to have sent in the mail ( did send a few days ago)  Patient would like a refill till he can get an appt.

## 2017-07-21 NOTE — TELEPHONE ENCOUNTER
Pt called requesting a refill on his Clobetasol 0.05% ointment,informed pt that  has given him a refill and that he should not be out already. Pt state's that he had to use the refill due to having more Psoriasis irritation. Pt would like if  could call in another refill pt is completely out pt's # [phone].   LOV 6/22/17  UOV 7/27/17

## 2017-07-24 RX ORDER — CLOBETASOL PROPIONATE 0.5 MG/G
OINTMENT TOPICAL
Qty: 45 G | Refills: 0 | Status: SHIPPED | OUTPATIENT
Start: 2017-07-24 | End: 2018-05-22

## 2017-07-24 NOTE — TELEPHONE ENCOUNTER
Increased amount to 45gm per fill (prior 30gm), but no refills on this interim refill until sees dermatology. Refill request approved. Requested Prescriptions     Signed Prescriptions Disp Refills    clobetasol (TEMOVATE) 0.05 % ointment 45 g 0     Sig: Apply  to affected area two (2) times daily as needed for Skin Irritation.      Authorizing Provider: Jesse Mccormick

## 2017-07-27 ENCOUNTER — OFFICE VISIT (OUTPATIENT)
Dept: INTERNAL MEDICINE CLINIC | Age: 67
End: 2017-07-27

## 2017-07-27 VITALS
RESPIRATION RATE: 16 BRPM | HEART RATE: 66 BPM | DIASTOLIC BLOOD PRESSURE: 65 MMHG | BODY MASS INDEX: 25.71 KG/M2 | HEIGHT: 69 IN | OXYGEN SATURATION: 95 % | WEIGHT: 173.6 LBS | SYSTOLIC BLOOD PRESSURE: 116 MMHG | TEMPERATURE: 97.7 F

## 2017-07-27 DIAGNOSIS — B35.4 TINEA CORPORIS: ICD-10-CM

## 2017-07-27 DIAGNOSIS — L40.9 PSORIASIS: Primary | ICD-10-CM

## 2017-07-27 DIAGNOSIS — I10 ESSENTIAL HYPERTENSION: ICD-10-CM

## 2017-07-27 DIAGNOSIS — I73.9 PAD (PERIPHERAL ARTERY DISEASE) (HCC): ICD-10-CM

## 2017-07-27 DIAGNOSIS — K27.9 PUD (PEPTIC ULCER DISEASE): ICD-10-CM

## 2017-07-27 DIAGNOSIS — B35.6 TINEA CRURIS: ICD-10-CM

## 2017-07-27 RX ORDER — PROPRANOLOL HYDROCHLORIDE 80 MG/1
80 TABLET ORAL 2 TIMES DAILY
Qty: 60 TAB | Refills: 5 | Status: SHIPPED | OUTPATIENT
Start: 2017-07-27 | End: 2018-02-06 | Stop reason: SDUPTHER

## 2017-07-27 RX ORDER — PRENATAL VIT 91/IRON/FOLIC/DHA 28-975-200
COMBINATION PACKAGE (EA) ORAL 2 TIMES DAILY
Qty: 30 G | Refills: 1 | Status: SHIPPED | OUTPATIENT
Start: 2017-07-27 | End: 2018-05-22

## 2017-07-27 RX ORDER — PANTOPRAZOLE SODIUM 40 MG/1
40 TABLET, DELAYED RELEASE ORAL DAILY
Qty: 30 TAB | Refills: 2 | Status: SHIPPED | OUTPATIENT
Start: 2017-07-27 | End: 2017-10-31 | Stop reason: SDUPTHER

## 2017-07-27 RX ORDER — PROPRANOLOL HYDROCHLORIDE 40 MG/1
40 TABLET ORAL 2 TIMES DAILY
Qty: 60 TAB | Refills: 5 | Status: SHIPPED | OUTPATIENT
Start: 2017-07-27 | End: 2018-02-06 | Stop reason: SDUPTHER

## 2017-07-27 NOTE — PROGRESS NOTES
RM 16    Chief Complaint   Patient presents with    Rash     psorisis flaring up - under arms and inner thighs x 6 -8 months       1. Have you been to the ER, urgent care clinic since your last visit? Hospitalized since your last visit? No    2. Have you seen or consulted any other health care providers outside of the [address] [place] [address] since your last visit? Include any pap smears or colon screening.  No    Health Maintenance Due   Topic Date Due    Hepatitis C Screening  1950    DTaP/Tdap/Td series (1 - Tdap) 05/17/1969    FOBT Q 1 YEAR AGE 50-75  05/17/1998    ZOSTER VACCINE AGE 60>  03/17/2008    GLAUCOMA SCREENING Q2Y  05/17/2013    Pneumococcal 65+ Low/Medium Risk (1 of 2 - PCV13) 05/17/2013    MEDICARE YEARLY EXAM  05/17/2013     Per pt has been 10 yrs since last eye exam     Pt aware of hm due   I went over vaccines with Pt    Stop smoking information and Living will  sent to pt AVS

## 2017-07-27 NOTE — MR AVS SNAPSHOT
Visit Information Date & Time Provider Department Dept. Phone Encounter #  
 7/27/2017 11:45 AM Mark Field, [address] [place] [address] and Internal Medicine [phone] [id number] Follow-up Instructions Return in about 3 months (around 10/27/2017) for Blood Pressure follow-up, referral follow-up. Upcoming Health Maintenance Date Due Hepatitis C Screening 1950 DTaP/Tdap/Td series (1 - Tdap) 5/17/1969 FOBT Q 1 YEAR AGE 50-75 5/17/1998 ZOSTER VACCINE AGE 60> 3/17/2008 GLAUCOMA SCREENING Q2Y 5/17/2013 Pneumococcal 65+ Low/Medium Risk (1 of 2 - PCV13) 5/17/2013 MEDICARE YEARLY EXAM 5/17/2013 INFLUENZA AGE 9 TO ADULT 8/1/2017 Allergies as of 7/27/2017  Review Complete On: 7/27/2017 By: Mark Field MD  
 No Known Allergies Current Immunizations  Never Reviewed No immunizations on file. Not reviewed this visit You Were Diagnosed With   
  
 Codes Comments Psoriasis    -  Primary ICD-10-CM: L40.9 ICD-9-CM: 696.1 PAD (peripheral artery disease) (HCC)     ICD-10-CM: I73.9 ICD-9-CM: 443.9 Essential hypertension     ICD-10-CM: I10 
ICD-9-CM: 401.9 PUD (peptic ulcer disease)     ICD-10-CM: K27.9 ICD-9-CM: 533.90 Normal prostate specific antigen (PSA) level     ICD-10-CM: Z78.9 ICD-9-CM: V49.89 Tinea cruris     ICD-10-CM: B35.6 ICD-9-CM: 110.3 Tinea corporis     ICD-10-CM: B35.4 ICD-9-CM: 110.5 Vitals BP Pulse Temp Resp Height(growth percentile) Weight(growth percentile) 116/65 (BP 1 Location: Left arm, BP Patient Position: Sitting) 66 97.7 °F (36.5 °C) (Oral) 16 5' 8.5\" (1.74 m) 173 lb 9.6 oz (78.7 kg) SpO2 BMI Smoking Status 95% 26.01 kg/m2 Heavy Tobacco Smoker BMI and BSA Data Body Mass Index Body Surface Area 26.01 kg/m 2 1.95 m 2 Preferred Pharmacy Pharmacy Name Phone  [facility]/PHARMACY #[id number]- [place], [place] - [address] [address] [facility] [phone] Your Updated Medication List  
  
   
This list is accurate as of: 7/27/17  1:12 PM.  Always use your most recent med list.  
  
  
  
  
 clobetasol 0.05 % ointment Commonly known as:  Salima Kenzie Apply  to affected area two (2) times daily as needed for Skin Irritation. olopatadine 0.6 % Spry Commonly known as:  PATANASE 2 Squirts by Both Nostrils route two (2) times daily as needed. Use instead of Azelastine nasal spray--not covered. pantoprazole 40 mg tablet Commonly known as:  PROTONIX Take 1 Tab by mouth daily. * propranolol 80 mg tablet Commonly known as:  INDERAL Take 1 Tab by mouth two (2) times a day. Take with 40mg tab, for 120mg two times daily. * propranolol 40 mg tablet Commonly known as:  INDERAL Take 1 Tab by mouth two (2) times a day. Take with 80mg tab, for 120mg two times daily. terbinafine HCl 1 % topical cream  
Commonly known as:  LAMISIL Apply  to affected area two (2) times a day. Apply to rash in groin and armpits as directed, instead of steroid cream.  
  
 * Notice: This list has 2 medication(s) that are the same as other medications prescribed for you. Read the directions carefully, and ask your doctor or other care provider to review them with you. Prescriptions Sent to Pharmacy Refills  
 propranolol (INDERAL) 80 mg tablet 5 Sig: Take 1 Tab by mouth two (2) times a day. Take with 40mg tab, for 120mg two times daily. Class: Normal  
 Pharmacy: [facility]/pharmacy #[id number] - [address] Ph #: [phone] Route: Oral  
 propranolol (INDERAL) 40 mg tablet 5 Sig: Take 1 Tab by mouth two (2) times a day. Take with 80mg tab, for 120mg two times daily. Class: Normal  
 Pharmacy: [facility]/pharmacy #[id number] - [address] Ph #: [phone]  Route: Oral  
 pantoprazole (PROTONIX) 40 mg tablet 2  
 Sig: Take 1 Tab by mouth daily. Class: Normal  
 Pharmacy: [facility]/pharmacy #[id number] [address]  Ph #: [phone] Route: Oral  
 terbinafine HCl (LAMISIL) 1 % topical cream 1 Sig: Apply  to affected area two (2) times a day. Apply to rash in groin and armpits as directed, instead of steroid cream.  
 Class: Normal  
 Pharmacy: [facility]/pharmacy #[id number] - [address] Ph #: [phone] Route: Topical  
  
Follow-up Instructions Return in about 3 months (around 10/27/2017) for Blood Pressure follow-up, referral follow-up. Patient Instructions Since you haven't spoken to pharmacy this week, call to see if the new prescription for the 45gm tube of the topical steroid (clobetasol) is covered/available. If not, please see if they can provide an affordable/covered alternative with your insurance. Please schedule with the dermatology (skin) doctor and the GI doctor. The GI doctor needs to see if your stomach has healed from prior ulcers on the Protonix, and they will do your routine colon cancer screening as well if you want them to do this test. 
 
Use the terbinafine cream on the rash as reviewed in clinic today. [address] What is a living will? A living will is a legal form you use to write down the kind of care you want at the end of your life. It is used by the health professionals who will treat you if you aren't able to decide for yourself. If you put your wishes in writing, your loved ones and others will know what kind of care you want. They won't need to guess. This can ease your mind and be helpful to others. A living will is not the same as an estate or property will. An estate will explains what you want to happen with your money and property after you die. Is a living will a legal document? A living will is a legal document.  Each state has its own laws about living bundy. If you move to another state, make sure that your living will is legal in the state where you now live. Or you might use a universal form that has been approved by many states. This kind of form can sometimes be completed and stored online. Your electronic copy will then be available wherever you have a connection to the Internet. In most cases, doctors will respect your wishes even if you have a form from a different state. · You don't need an  to complete a living will. But legal advice can be helpful if your state's laws are unclear, your health history is complicated, or your family can't agree on what should be in your living will. · You can change your living will at any time. Some people find that their wishes about end-of-life care change as their health changes. · In addition to making a living will, think about completing a medical power of  form. This form lets you name the person you want to make end-of-life treatment decisions for you (your \"health care agent\") if you're not able to. Many hospitals and nursing homes will give you the forms you need to complete a living will and a medical power of . · Your living will is used only if you can't make or communicate decisions for yourself anymore. If you become able to make decisions again, you can accept or refuse any treatment, no matter what you wrote in your living will. · Your state may offer an online registry. This is a place where you can store your living will online so the doctors and nurses who need to treat you can find it right away. What should you think about when creating a living will? Talk about your end-of-life wishes with your family members and your doctor. Let them know what you want. That way the people making decisions for you won't be surprised by your choices. Think about these questions as you make your living will: · Do you know enough about life support methods that might be used? If not, talk to your doctor so you know what might be done if you can't breathe on your own, your heart stops, or you're unable to swallow. · What things would you still want to be able to do after you receive life-support methods? Would you want to be able to walk? To speak? To eat on your own? To live without the help of machines? · If you have a choice, where do you want to be cared for? In your home? At a hospital or nursing home? · Do you want certain Buddhist practices performed if you become very ill? · If you have a choice at the end of your life, where would you prefer to die? At home? In a hospital or nursing home? Somewhere else? · Would you prefer to be buried or cremated? · Do you want your organs to be donated after you die? What should you do with your living will? · Make sure that your family members and your health care agent have copies of your living will. · Give your doctor a copy of your living will to keep in your medical record. If you have more than one doctor, make sure that each one has a copy. · You may want to put a copy of your living will where it can be easily found. Where can you learn more? Go to http://mike-braden.info/. Enter H189 in the search box to learn more about \"Learning About Living Perropage. \" Current as of: August 8, 2016 Content Version: 11.3 © 8225-8280 [employer]. Care instructions adapted under license by [employer] (which disclaims liability or warranty for this information). If you have questions about a medical condition or this instruction, always ask your healthcare professional. [place] [address] any warranty or liability for your use of this information. Stopping Smoking: Care Instructions Your Care Instructions Cigarette smokers crave the nicotine in cigarettes.  Giving it up is much harder than simply changing a habit. Your body has to stop craving the nicotine. It is hard to quit, but you can do it. There are many tools that people use to quit smoking. You may find that combining tools works best for you. There are several steps to quitting. First you get ready to quit. Then you get support to help you. After that, you learn new skills and behaviors to become a nonsmoker. For many people, a necessary step is getting and using medicine. Your doctor will help you set up the plan that best meets your needs. You may want to attend a smoking cessation program to help you quit smoking. When you choose a program, look for one that has proven success. Ask your doctor for ideas. You will greatly increase your chances of success if you take medicine as well as get counseling or join a cessation program. 
Some of the changes you feel when you first quit tobacco are uncomfortable. Your body will miss the nicotine at first, and you may feel short-tempered and grumpy. You may have trouble sleeping or concentrating. Medicine can help you deal with these symptoms. You may struggle with changing your smoking habits and rituals. The last step is the tricky one: Be prepared for the smoking urge to continue for a time. This is a lot to deal with, but keep at it. You will feel better. Follow-up care is a key part of your treatment and safety. Be sure to make and go to all appointments, and call your doctor if you are having problems. Its also a good idea to know your test results and keep a list of the medicines you take. How can you care for yourself at home? · Ask your family, friends, and coworkers for support. You have a better chance of quitting if you have help and support. · Join a support group, such as Nicotine Anonymous, for people who are trying to quit smoking.  
· Consider signing up for a smoking cessation program, such as the American Lung Association's Freedom from Smoking program. 
 · Set a quit date. Pick your date carefully so that it is not right in the middle of a big deadline or stressful time. Once you quit, do not even take a puff. Get rid of all ashtrays and lighters after your last cigarette. Clean your house and your clothes so that they do not smell of smoke. · Learn how to be a nonsmoker. Think about ways you can avoid those things that make you reach for a cigarette. ¨ Avoid situations that put you at greatest risk for smoking. For some people, it is hard to have a drink with friends without smoking. For others, they might skip a coffee break with coworkers who smoke. ¨ Change your daily routine. Take a different route to work or eat a meal in a different place. · Cut down on stress. Calm yourself or release tension by doing an activity you enjoy, such as reading a book, taking a hot bath, or gardening. · Talk to your doctor or pharmacist about nicotine replacement therapy, which replaces the nicotine in your body. You still get nicotine but you do not use tobacco. Nicotine replacement products help you slowly reduce the amount of nicotine you need. These products come in several forms, many of them available over-the-counter: ¨ Nicotine patches ¨ Nicotine gum and lozenges ¨ Nicotine inhaler · Ask your doctor about bupropion (Wellbutrin) or varenicline (Chantix), which are prescription medicines. They do not contain nicotine. They help you by reducing withdrawal symptoms, such as stress and anxiety. · Some people find hypnosis, acupuncture, and massage helpful for ending the smoking habit. · Eat a healthy diet and get regular exercise. Having healthy habits will help your body move past its craving for nicotine. · Be prepared to keep trying. Most people are not successful the first few times they try to quit. Do not get mad at yourself if you smoke again.  Make a list of things you learned and think about when you want to try again, such as next week, next month, or next year. Where can you learn more? Go to http://mike-braden.info/. Enter S087 in the search box to learn more about \"Stopping Smoking: Care Instructions. \" Current as of: March 20, 2017 Content Version: 11.3 © 7621-6475 "[employer]. Care instructions adapted under license by [employer] (which disclaims liability or warranty for this information). If you have questions about a medical condition or this instruction, always ask your healthcare professional. [address] any warranty or liability for your use of this information. Learning About High Cholesterol What is high cholesterol? Cholesterol is a type of fat in your blood. It is needed for many body functions, such as making new cells. Cholesterol is made by your body. It also comes from food you eat. If you have too much cholesterol, it starts to build up in your arteries. This is called hardening of the arteries, or atherosclerosis. High cholesterol raises your risk of a heart attack and stroke. There are different types of cholesterol. LDL is the \"bad\" cholesterol. High LDL can raise your risk for heart disease, heart attack, and stroke. HDL is the \"good\" cholesterol. High HDL is linked with a lower risk for heart disease, heart attack, and stroke. Your cholesterol levels help your doctor find out your risk for having a heart attack or stroke. How can you prevent high cholesterol? A heart-healthy lifestyle can help you prevent high cholesterol. This lifestyle helps lower your risk for a heart attack and stroke. · Eat heart-healthy foods. ¨ Eat fruits, vegetables, whole grains (like oatmeal), dried beans and peas, nuts and seeds, soy products (like tofu), and fat-free or low-fat dairy products.  
¨ Replace butter, margarine, and hydrogenated or partially hydrogenated oils with olive and canola oils. (Canola oil margarine without trans fat is fine.) ¨ Replace red meat with fish, poultry, and soy protein (like tofu). ¨ Limit processed and packaged foods like chips, crackers, and cookies. · Be active. Exercise can improve your cholesterol level. Get at least 30 minutes of exercise on most days of the week. Walking is a good choice. You also may want to do other activities, such as running, swimming, cycling, or playing tennis or team sports. · Stay at a healthy weight. Lose weight if you need to. · Don't smoke. If you need help quitting, talk to your doctor about stop-smoking programs and medicines. These can increase your chances of quitting for good. How is high cholesterol treated? The goal of treatment is to reduce your chances of having a heart attack or stroke. The goal is not to lower your cholesterol numbers only. · You may make lifestyle changes, such as eating healthy foods, not smoking, losing weight, and being more active. · You may have to take medicine. Follow-up care is a key part of your treatment and safety. Be sure to make and go to all appointments, and call your doctor if you are having problems. It's also a good idea to know your test results and keep a list of the medicines you take. Where can you learn more? Go to http://mike-braden.info/. Enter D208 in the search box to learn more about \"Learning About High Cholesterol. \" Current as of: April 3, 2017 Content Version: 11.3 © 8835-4942 [employer]. Care instructions adapted under license by [employer] (which disclaims liability or warranty for this information). If you have questions about a medical condition or this instruction, always ask your healthcare professional. [place] [address] any warranty or liability for your use of this information. Introducing [facility]! [facility] introduces Pulse patient portal. Now you can access parts of your medical record, email your doctor's office, and request medication refills online. 1. In your internet browser, go to [email or web address]???? 2. Click on the First Time User? Click Here link in the Sign In box. You will see the New Member Sign Up page. 3. Enter your Pulse Access Code exactly as it appears below. You will not need to use this code after youve completed the sign-up process. If you do not sign up before the expiration date, you must request a new code. · Pulse Access Code: [email or web address] Expires: 10/25/2017 11:47 AM 
 
4. Enter the last four digits of your Social Security Number (xxxx) and Date of Birth (mm/dd/yyyy) as indicated and click Submit. You will be taken to the next sign-up page. 5. Create a Pulse ID. This will be your Pulse login ID and cannot be changed, so think of one that is secure and easy to remember. 6. Create a Pulse password. You can change your password at any time. 7. Enter your Password Reset Question and Answer. This can be used at a later time if you forget your password. 8. Enter your e-mail address. You will receive e-mail notification when new information is available in [address] 9. Click Sign Up. You can now view and download portions of your medical record. 10. Click the Download Summary menu link to download a portable copy of your medical information. If you have questions, please visit the Frequently Asked Questions section of the Pulse website. Remember, Pulse is NOT to be used for urgent needs. For medical emergencies, dial 911. Now available from your iPhone and Android! Please provide this summary of care documentation to your next provider. Your primary care clinician is listed as [address] If you have any questions after today's visit, please call [phone].

## 2017-07-27 NOTE — PATIENT INSTRUCTIONS
Since you haven't spoken to pharmacy this week, call to see if the new prescription for the 45gm tube of the topical steroid (clobetasol) is covered/available. If not, please see if they can provide an affordable/covered alternative with your insurance. Please schedule with the dermatology (skin) doctor and the GI doctor. The GI doctor needs to see if your stomach has healed from prior ulcers on the Protonix, and they will do your routine colon cancer screening as well if you want them to do this test.    Use the terbinafine cream on the rash as reviewed in clinic today. Learning About Living [place]  What is a living will? A living will is a legal form you use to write down the kind of care you want at the end of your life. It is used by the health professionals who will treat you if you aren't able to decide for yourself. If you put your wishes in writing, your loved ones and others will know what kind of care you want. They won't need to guess. This can ease your mind and be helpful to others. A living will is not the same as an estate or property will. An estate will explains what you want to happen with your money and property after you die. Is a living will a legal document? A living will is a legal document. Each state has its own laws about living [place]. If you move to another state, make sure that your living will is legal in the state where you now live. Or you might use a universal form that has been approved by many states. This kind of form can sometimes be completed and stored online. Your electronic copy will then be available wherever you have a connection to the Internet. In most cases, doctors will respect your wishes even if you have a form from a different state. · You don't need an  to complete a living will.  But legal advice can be helpful if your state's laws are unclear, your health history is complicated, or your family can't agree on what should be in your living will. · You can change your living will at any time. Some people find that their wishes about end-of-life care change as their health changes. · In addition to making a living will, think about completing a medical power of  form. This form lets you name the person you want to make end-of-life treatment decisions for you (your \"health care agent\") if you're not able to. Many hospitals and nursing homes will give you the forms you need to complete a living will and a medical power of . · Your living will is used only if you can't make or communicate decisions for yourself anymore. If you become able to make decisions again, you can accept or refuse any treatment, no matter what you wrote in your living will. · Your state may offer an online registry. This is a place where you can store your living will online so the doctors and nurses who need to treat you can find it right away. What should you think about when creating a living will? Talk about your end-of-life wishes with your family members and your doctor. Let them know what you want. That way the people making decisions for you won't be surprised by your choices. Think about these questions as you make your living will:  · Do you know enough about life support methods that might be used? If not, talk to your doctor so you know what might be done if you can't breathe on your own, your heart stops, or you're unable to swallow. · What things would you still want to be able to do after you receive life-support methods? Would you want to be able to walk? To speak? To eat on your own? To live without the help of machines? · If you have a choice, where do you want to be cared for? In your home? At a hospital or nursing home? · Do you want certain Mormonism practices performed if you become very ill? · If you have a choice at the end of your life, where would you prefer to die? At home? In a hospital or nursing home?  Somewhere else?  · Would you prefer to be buried or cremated? · Do you want your organs to be donated after you die? What should you do with your living will? · Make sure that your family members and your health care agent have copies of your living will. · Give your doctor a copy of your living will to keep in your medical record. If you have more than one doctor, make sure that each one has a copy. · You may want to put a copy of your living will where it can be easily found. Where can you learn more? Go to http://mike-braden.info/. Enter P439 in the search box to learn more about \"Learning About Living [place]. \"  Current as of: August 8, 2016  Content Version: 11.3  © 2362-9109 [employer]. Care instructions adapted under license by [employer] (which disclaims liability or warranty for this information). If you have questions about a medical condition or this instruction, always ask your healthcare professional. [place] [address] any warranty or liability for your use of this information. Stopping Smoking: Care Instructions  Your Care Instructions  Cigarette smokers crave the nicotine in cigarettes. Giving it up is much harder than simply changing a habit. Your body has to stop craving the nicotine. It is hard to quit, but you can do it. There are many tools that people use to quit smoking. You may find that combining tools works best for you. There are several steps to quitting. First you get ready to quit. Then you get support to help you. After that, you learn new skills and behaviors to become a nonsmoker. For many people, a necessary step is getting and using medicine. Your doctor will help you set up the plan that best meets your needs. You may want to attend a smoking cessation program to help you quit smoking. When you choose a program, look for one that has proven success. Ask your doctor for ideas.  You will greatly increase your chances of success if you take medicine as well as get counseling or join a cessation program.  Some of the changes you feel when you first quit tobacco are uncomfortable. Your body will miss the nicotine at first, and you may feel short-tempered and grumpy. You may have trouble sleeping or concentrating. Medicine can help you deal with these symptoms. You may struggle with changing your smoking habits and rituals. The last step is the tricky one: Be prepared for the smoking urge to continue for a time. This is a lot to deal with, but keep at it. You will feel better. Follow-up care is a key part of your treatment and safety. Be sure to make and go to all appointments, and call your doctor if you are having problems. Its also a good idea to know your test results and keep a list of the medicines you take. How can you care for yourself at home? · Ask your family, friends, and coworkers for support. You have a better chance of quitting if you have help and support. · Join a support group, such as Nicotine Anonymous, for people who are trying to quit smoking. · Consider signing up for a smoking cessation program, such as the American Lung Association's Freedom from Smoking program.  · Set a quit date. Pick your date carefully so that it is not right in the middle of a big deadline or stressful time. Once you quit, do not even take a puff. Get rid of all ashtrays and lighters after your last cigarette. Clean your house and your clothes so that they do not smell of smoke. · Learn how to be a nonsmoker. Think about ways you can avoid those things that make you reach for a cigarette. ¨ Avoid situations that put you at greatest risk for smoking. For some people, it is hard to have a drink with friends without smoking. For others, they might skip a coffee break with coworkers who smoke. ¨ Change your daily routine. Take a different route to work or eat a meal in a different place. · Cut down on stress.  Calm yourself or release tension by doing an activity you enjoy, such as reading a book, taking a hot bath, or gardening. · Talk to your doctor or pharmacist about nicotine replacement therapy, which replaces the nicotine in your body. You still get nicotine but you do not use tobacco. Nicotine replacement products help you slowly reduce the amount of nicotine you need. These products come in several forms, many of them available over-the-counter:  ¨ Nicotine patches  ¨ Nicotine gum and lozenges  ¨ Nicotine inhaler  · Ask your doctor about bupropion (Wellbutrin) or varenicline (Chantix), which are prescription medicines. They do not contain nicotine. They help you by reducing withdrawal symptoms, such as stress and anxiety. · Some people find hypnosis, acupuncture, and massage helpful for ending the smoking habit. · Eat a healthy diet and get regular exercise. Having healthy habits will help your body move past its craving for nicotine. · Be prepared to keep trying. Most people are not successful the first few times they try to quit. Do not get mad at yourself if you smoke again. Make a list of things you learned and think about when you want to try again, such as next week, next month, or next year. Where can you learn more? Go to http://[employer].braden.info/. Enter Z806 in the search box to learn more about \"Stopping Smoking: Care Instructions. \"  Current as of: March 20, 2017  Content Version: 11.3  © 4122-6307 [employer]. Care instructions adapted under license by [employer] (which disclaims liability or warranty for this information). If you have questions about a medical condition or this instruction, always ask your healthcare professional. [address] any warranty or liability for your use of this information. Learning About High Cholesterol  What is high cholesterol? Cholesterol is a type of fat in your blood.  It is needed for many body functions, such as making new cells. Cholesterol is made by your body. It also comes from food you eat. If you have too much cholesterol, it starts to build up in your arteries. This is called hardening of the arteries, or atherosclerosis. High cholesterol raises your risk of a heart attack and stroke. There are different types of cholesterol. LDL is the \"bad\" cholesterol. High LDL can raise your risk for heart disease, heart attack, and stroke. HDL is the \"good\" cholesterol. High HDL is linked with a lower risk for heart disease, heart attack, and stroke. Your cholesterol levels help your doctor find out your risk for having a heart attack or stroke. How can you prevent high cholesterol? A heart-healthy lifestyle can help you prevent high cholesterol. This lifestyle helps lower your risk for a heart attack and stroke. · Eat heart-healthy foods. ¨ Eat fruits, vegetables, whole grains (like oatmeal), dried beans and peas, nuts and seeds, soy products (like tofu), and fat-free or low-fat dairy products. ¨ Replace butter, margarine, and hydrogenated or partially hydrogenated oils with olive and canola oils. (Canola oil margarine without trans fat is fine.)  ¨ Replace red meat with fish, poultry, and soy protein (like tofu). ¨ Limit processed and packaged foods like chips, crackers, and cookies. · Be active. Exercise can improve your cholesterol level. Get at least 30 minutes of exercise on most days of the week. Walking is a good choice. You also may want to do other activities, such as running, swimming, cycling, or playing tennis or team sports. · Stay at a healthy weight. Lose weight if you need to. · Don't smoke. If you need help quitting, talk to your doctor about stop-smoking programs and medicines. These can increase your chances of quitting for good. How is high cholesterol treated? The goal of treatment is to reduce your chances of having a heart attack or stroke.  The goal is not to lower your cholesterol numbers only. · You may make lifestyle changes, such as eating healthy foods, not smoking, losing weight, and being more active. · You may have to take medicine. Follow-up care is a key part of your treatment and safety. Be sure to make and go to all appointments, and call your doctor if you are having problems. It's also a good idea to know your test results and keep a list of the medicines you take. Where can you learn more? Go to http://mike-braden.info/. Enter X173 in the search box to learn more about \"Learning About High Cholesterol. \"  Current as of: April 3, 2017  Content Version: 11.3  © 5903-2448 [employer]. Care instructions adapted under license by [employer] (which disclaims liability or warranty for this information). If you have questions about a medical condition or this instruction, always ask your healthcare professional. [address] any warranty or liability for your use of this information.

## 2017-07-27 NOTE — PROGRESS NOTES
HISTORY OF PRESENT ILLNESS  Sheree Riley is a 69 y.o. male. HPI  Here for follow-up. He is out of the topical steroid for past week. Told by insurance more than one week ago not covered. Reviewed prior labs with pt. Prefers to do prostate exam at follow-up. Reviewed rash--reviewed refill clobetasol, but would use different/topical anti-fungal on rash in groin and left axilla. These are more typical of fungal infections, and pt notes not typical areas in which he has psoriasis. ROS      Blood pressure 116/65, pulse 66, temperature 97.7 °F (36.5 °C), temperature source Oral, resp. rate 16, height 5' 8.5\" (1.74 m), weight 173 lb 9.6 oz (78.7 kg), SpO2 95 %. Physical Exam   Constitutional: He appears well-developed and well-nourished. No distress. HENT:   Head: Normocephalic and atraumatic. Eyes: Conjunctivae are normal. Right eye exhibits no discharge. Left eye exhibits no discharge. No scleral icterus. Neck: Normal range of motion. Neck supple. Cardiovascular: Normal rate, regular rhythm, normal heart sounds and intact distal pulses. Exam reveals no gallop and no friction rub. No murmur heard. Pulmonary/Chest: Effort normal and breath sounds normal. No respiratory distress. He has no wheezes. He has no rales. Abdominal: Soft. Bowel sounds are normal. He exhibits no distension. There is no tenderness. Musculoskeletal: He exhibits no edema or tenderness. Neurological: He is alert. He exhibits normal muscle tone. Coordination normal.   Skin: Skin is warm. Rash noted. He is not diaphoretic. No erythema. No pallor. Psychiatric: He has a normal mood and affect. His behavior is normal. Judgment and thought content normal.   Skin:  Erythema with background hyperpigmentation bilat groin and erythema of scrotum, c/w tinea cruris more so than psoriatic lesions.   Left axillary lesion with linear/circular erythematous rash--not typical of psoriasis--treatment as below.      ASSESSMENT and PLAN    ICD-10-CM ICD-9-CM    1. Psoriasis L40.9 696.1    2. PAD (peripheral artery disease) (HCC) I73.9 443.9    3. Essential hypertension I10 401.9 propranolol (INDERAL) 80 mg tablet      propranolol (INDERAL) 40 mg tablet   4. PUD (peptic ulcer disease) K27.9 533.90 pantoprazole (PROTONIX) 40 mg tablet   5. Normal prostate specific antigen (PSA) level Z78.9 V49.89    6. Tinea cruris B35.6 110.3 terbinafine HCl (LAMISIL) 1 % topical cream   7. Tinea corporis B35.4 110.5 terbinafine HCl (LAMISIL) 1 % topical cream       1. Derm referral re-printed for pt today--unable to reach to review prior by phone. 2.  Seen by vascular. No intervention planned unless leg pain worsens with activity. 4.  GI referral re-printed for pt today. Reviewed reason for evaluation at visit. 5.  Pt deferred prostate exam today at visit--plan at follow-up as below. 6,7:  Treatment reviewed. Follow-up Disposition:  Return in about 3 months (around 10/27/2017) for Blood Pressure follow-up, referral follow-up.  lab results and schedule of future lab studies reviewed with patient  reviewed diet, exercise and weight control  reviewed medications and side effects in detail    For additional documentation of information and/or recommendations discussed this visit, please see notes in instructions. Plan and evaluation (above) reviewed with pt at visit  Patient voiced understanding of plan and provided with time to ask/review questions. After Visit Summary (AVS) provided to pt after visit with additional instructions as needed/reviewed.

## 2018-02-06 ENCOUNTER — OFFICE VISIT (OUTPATIENT)
Dept: INTERNAL MEDICINE CLINIC | Age: 68
End: 2018-02-06

## 2018-02-06 VITALS
HEART RATE: 61 BPM | HEIGHT: 69 IN | TEMPERATURE: 98.4 F | SYSTOLIC BLOOD PRESSURE: 166 MMHG | WEIGHT: 168.2 LBS | RESPIRATION RATE: 16 BRPM | OXYGEN SATURATION: 99 % | DIASTOLIC BLOOD PRESSURE: 84 MMHG | BODY MASS INDEX: 24.91 KG/M2

## 2018-02-06 DIAGNOSIS — K21.9 GASTROESOPHAGEAL REFLUX DISEASE WITHOUT ESOPHAGITIS: ICD-10-CM

## 2018-02-06 DIAGNOSIS — K27.9 PUD (PEPTIC ULCER DISEASE): ICD-10-CM

## 2018-02-06 DIAGNOSIS — Z12.11 SCREENING FOR COLON CANCER: ICD-10-CM

## 2018-02-06 DIAGNOSIS — I10 ESSENTIAL HYPERTENSION: Primary | ICD-10-CM

## 2018-02-06 RX ORDER — AZELASTINE HCL 205.5 UG/1
SPRAY NASAL
Refills: 5 | COMMUNITY
Start: 2018-01-29 | End: 2018-09-19 | Stop reason: SDUPTHER

## 2018-02-06 RX ORDER — HALOBETASOL PROPIONATE 0.5 MG/G
OINTMENT TOPICAL
Refills: 3 | COMMUNITY
Start: 2018-01-29 | End: 2019-02-28 | Stop reason: SDUPTHER

## 2018-02-06 RX ORDER — AMLODIPINE BESYLATE 5 MG/1
5 TABLET ORAL DAILY
Qty: 30 TAB | Refills: 2 | Status: SHIPPED | OUTPATIENT
Start: 2018-02-06 | End: 2018-05-09 | Stop reason: SDUPTHER

## 2018-02-06 RX ORDER — PROPRANOLOL HYDROCHLORIDE 80 MG/1
80 TABLET ORAL 2 TIMES DAILY
Qty: 60 TAB | Refills: 5 | Status: SHIPPED | OUTPATIENT
Start: 2018-02-06 | End: 2018-05-22 | Stop reason: SDUPTHER

## 2018-02-06 RX ORDER — PROPRANOLOL HYDROCHLORIDE 40 MG/1
40 TABLET ORAL 2 TIMES DAILY
Qty: 60 TAB | Refills: 5 | Status: SHIPPED | OUTPATIENT
Start: 2018-02-06 | End: 2018-05-22 | Stop reason: SDUPTHER

## 2018-02-06 RX ORDER — MUPIROCIN 20 MG/G
OINTMENT TOPICAL
Refills: 0 | COMMUNITY
Start: 2017-12-18 | End: 2018-05-22

## 2018-02-06 RX ORDER — TRIAMCINOLONE ACETONIDE 1 MG/ML
LOTION TOPICAL
Refills: 3 | COMMUNITY
Start: 2017-12-28 | End: 2019-02-28

## 2018-02-06 RX ORDER — PANTOPRAZOLE SODIUM 40 MG/1
TABLET, DELAYED RELEASE ORAL
Qty: 30 TAB | Refills: 2 | Status: SHIPPED | OUTPATIENT
Start: 2018-02-06 | End: 2018-05-09 | Stop reason: SDUPTHER

## 2018-02-06 NOTE — PROGRESS NOTES
History of Present Illness:   Lucy Portillo is a 69 y.o. male here for evaluation:    Chief Complaint   Patient presents with    Medication Evaluation     Notes:  Patient presents today for a medication evaluation/also has fasted for labs if needed  Patient is due for a medicare wellness exam      He is not on Medicare. Reviewed due for prostate exam--did not want to do at last visit. Last seen here July 2017. He prefers not to do prostate exam today, but to do at follow-up. He notes on problems with appts or follow-up. He lives close to here and states can come any time. Reviewed would see regularly every 3-4mo to monitor BP/HTN. Plan 2-4 week follow-up as below with addition BP med today. Reviewed addition amlodipine. Prefer over ACE-I or ARB, as his follow-up is inconsistent, and prefer every 6mo lab monitoring on ACE-I or ARB. He has not run out of propranolol. Notes taking both tabs (80mg + 40mg) BID. Had extra, and has not run out, although last refill here July 2017 for 6mo. Refill reviewed today. He notes he had extra and has not run out of meds, and fills regularly first of month. Notes has not seen GI. He notes needs refill of PPI/Protonix for GERD symptoms. He notes has not seen GI prior--initial referral April 2017. Reviewed eval there for chronic PPI use/GERD symptoms and for colonoscopy/colon cancer screening. He is agreeable with referral.  Prefers PPI refill over H2 blocker trial in interim. Past Medical History:   Diagnosis Date    Allergic rhinitis     Arthritis     Dyspepsia and other specified disorders of function of stomach     Headache     Hypertension     Stomach ulcer         Prior to Admission medications    Medication Sig Start Date End Date Taking?  Authorizing Provider   Azelastine (ASTEPRO) 0.15 % (205.5 mcg) nasal spray USE 1-2 SPRAYS IN BOTH NOSTRILS ROUTE TWO (2) TIMES A DAY 1/29/18  Yes Historical Provider   halobetasol (Nallely Kroner) 0.05 % ointment APPLY TO AFFECTED AREA TWICE A DAY AS NEEDED -AVOID FACE/ GROIN/ ARMPITS 1/29/18  Yes Historical Provider   mupirocin (BACTROBAN) 2 % ointment APPLY TO AFFECTED AREA 3 TIMES A DAY FOR 10 DAYS 12/18/17  Yes Historical Provider   triamcinolone (KENALOG) 0.1 % lotion APPLY TO SCALP TWICE DAILY FOR FLAKING AND DEKALB [facility] 12/28/17  Yes Historical Provider   pantoprazole (PROTONIX) 40 mg tablet TAKE 1 TAB BY MOUTH DAILY. 11/1/17  Yes Cj Darden MD   propranolol (INDERAL) 80 mg tablet Take 1 Tab by mouth two (2) times a day. Take with 40mg tab, for 120mg two times daily. 7/27/17  Yes Cj Darden MD   propranolol (INDERAL) 40 mg tablet Take 1 Tab by mouth two (2) times a day. Take with 80mg tab, for 120mg two times daily. 7/27/17  Yes Cj Darden MD   olopatadine (PATANASE) 0.6 % spry 2 Squirts by Both Nostrils route two (2) times daily as needed. Use instead of Azelastine nasal spray--not covered. 6/16/17  Yes Cj Darden MD   terbinafine HCl (LAMISIL) 1 % topical cream Apply  to affected area two (2) times a day. Apply to rash in groin and armpits as directed, instead of steroid cream. 7/27/17   Cj Darden MD   clobetasol (TEMOVATE) 0.05 % ointment Apply  to affected area two (2) times daily as needed for Skin Irritation. 7/24/17   Cj Darden MD        ROS    Vitals:    02/06/18 0848 02/06/18 0855   BP: 162/89 166/84   Pulse: 61    Resp: 16    Temp: 98.4 °F (36.9 °C)    TempSrc: Oral    SpO2: 99%    Weight: 168 lb 3.2 oz (76.3 kg)    Height: 5' 8.5\" (1.74 m)    PainSc:   0 - No pain         Physical Exam:     Physical Exam   Constitutional: He appears well-developed and well-nourished. No distress. HENT:   Head: Normocephalic and atraumatic. Eyes: Conjunctivae are normal. Right eye exhibits no discharge. Left eye exhibits no discharge. No scleral icterus. Neck: Normal range of motion. Neck supple. No tracheal deviation present.  No thyromegaly present. Cardiovascular: Normal rate, regular rhythm, normal heart sounds and intact distal pulses. Exam reveals no gallop and no friction rub. No murmur heard. Pulmonary/Chest: Effort normal and breath sounds normal. No stridor. No respiratory distress. He has no wheezes. He has no rales. Abdominal: Soft. Bowel sounds are normal. He exhibits no distension. There is no tenderness. There is no rebound and no guarding. Musculoskeletal: He exhibits no edema, tenderness or deformity. Lymphadenopathy:     He has no cervical adenopathy. Neurological: He is alert. He exhibits normal muscle tone. Coordination normal.   Skin: Skin is warm. No rash noted. He is not diaphoretic. No erythema. No pallor. Psychiatric: He has a normal mood and affect. His behavior is normal. Judgment and thought content normal.       Assessment and Plan:       ICD-10-CM ICD-9-CM    1. Essential hypertension I10 401.9 amLODIPine (NORVASC) 5 mg tablet      propranolol (INDERAL) 80 mg tablet      propranolol (INDERAL) 40 mg tablet   2. Screening for colon cancer Z12.11 V76.51 REFERRAL TO GASTROENTEROLOGY   3. Gastroesophageal reflux disease without esophagitis K21.9 530.81 REFERRAL TO GASTROENTEROLOGY      pantoprazole (PROTONIX) 40 mg tablet   4. PUD (peptic ulcer disease) K27.9 533.90 REFERRAL TO GASTROENTEROLOGY      pantoprazole (PROTONIX) 40 mg tablet       1. Add amlodipine daily. Continue propranol. Reviewed that HR limits further increase beta blocker currently. Reviewed labs from July 2017, and prefers not to do labs today. 2.  Referral for screening reviewed. 3,4:  Reviewed GI may schedule EGD with colonoscopy. Referral/evaluation and scheduling reviewed. Follow-up Disposition:  Return in about 2 weeks (around 2/20/2018) for Blood Pressure follow-up, referral follow-up--2-4weeks.   lab results and schedule of future lab studies reviewed with patient  reviewed medications and side effects in detail    For additional documentation of information and/or recommendations discussed this visit, please see notes in instructions. Plan and evaluation (above) reviewed with pt at visit  Patient voiced understanding of plan and provided with time to ask/review questions. After Visit Summary (AVS) provided to pt after visit with additional instructions as needed/reviewed.

## 2018-02-06 NOTE — PATIENT INSTRUCTIONS
1.  Add the amlodipine to your current propranolol. Although you take the propranolol two times daily, you only need to take the amlodipine once daily. You can add the amlodipine to either the morning or the evening dose of the propranolol. 2.  Please follow the following instructions to process/authorize your referral, as reviewed today:    Referrals processing  Please verify with your insurance IF you need referral authorization submitted. For insurance plans which require this, please follow the following steps. FAILURE TO DO SO MAY RESULT IN INABILITY TO SEE THE SPECIALIST YOU HAVE BEEN REFERRED TO.   1. Call and schedule appointment with specialist  2. Call our clinic and leave message with provider name, and date of appointment  3. We will then submit the referral to your insurance. This process takes 2-5 business days. If you have questions about scheduling or authorizing referral, you can review with our referral coordinators Amee Bialey or Lisa Roman) at the . You can review with them today if available/if you have time, or you can call to review with them once you have made your referral/appointment. If you are not sure if you need referral authorizations, please review with the referral coordinators, either prior to or after you have made the appointment, as reviewed.

## 2018-02-06 NOTE — PROGRESS NOTES
Rm 18    Chief Complaint   Patient presents with    Medication Evaluation   Patient presents today for a medication evaluation/also has fasted for labs if needed  Patient is due for a medicare wellness exam       Health Maintenance Due   Topic Date Due    Hepatitis C Screening  1950    DTaP/Tdap/Td series (1 - Tdap) 05/17/1969    FOBT Q 1 YEAR AGE 50-75  05/17/1998    ZOSTER VACCINE AGE 60>  03/17/2008    GLAUCOMA SCREENING Q2Y  05/17/2013    Pneumococcal 65+ Low/Medium Risk (1 of 2 - PCV13) 05/17/2013    MEDICARE YEARLY EXAM  05/17/2013    Influenza Age 9 to Adult  08/01/2017     Patient is due for several test including medicare wellness and several vaccines    1. Have you been to the ER, urgent care clinic since your last visit? Hospitalized since your last visit? No    2. Have you seen or consulted any other health care providers outside of the [address] [place] [address] since your last visit? Include any pap smears or colon screening. No    Learning Assessment 2/6/2018   PRIMARY LEARNER Patient   PRIMARY LANGUAGE ENGLISH   LEARNER PREFERENCE PRIMARY READING   ANSWERED BY patient   RELATIONSHIP SELF     Fall Risk Assessment, last 12 mths 7/27/2017   Able to walk? Yes   Fall in past 12 months? No     Abuse Screening Questionnaire 2/6/2018   Do you ever feel afraid of your partner? N   Are you in a relationship with someone who physically or mentally threatens you? N   Is it safe for you to go home?  Y       PHQ over the last two weeks 7/27/2017   Little interest or pleasure in doing things Not at all   Feeling down, depressed or hopeless Not at all   Total Score PHQ 2 0     Living medical will information given at previous visit

## 2018-02-06 NOTE — MR AVS SNAPSHOT
[address] [address] 
[phone] Patient: Darrin Blackburn MRN: [id number] BBP:9/84/9020 Visit Information Date & Time Provider Department Dept. Phone Encounter #  
 2/6/2018  8:45 AM Jillian Cody, [address] [place] [address] and Internal Medicine [phone] [id number] Follow-up Instructions Return in about 2 weeks (around 2/20/2018) for Blood Pressure follow-up, referral follow-up--2-4weeks. Upcoming Health Maintenance Date Due Hepatitis C Screening 1950 DTaP/Tdap/Td series (1 - Tdap) 5/17/1969 FOBT Q 1 YEAR AGE 50-75 5/17/1998 ZOSTER VACCINE AGE 60> 3/17/2008 GLAUCOMA SCREENING Q2Y 5/17/2013 Pneumococcal 65+ Low/Medium Risk (1 of 2 - PCV13) 5/17/2013 MEDICARE YEARLY EXAM 5/17/2013 Influenza Age 5 to Adult 8/1/2017 Allergies as of 2/6/2018  Review Complete On: 2/6/2018 By: Jillian Cody MD  
 No Known Allergies Current Immunizations  Never Reviewed No immunizations on file. Not reviewed this visit You Were Diagnosed With   
  
 Codes Comments Essential hypertension    -  Primary ICD-10-CM: I10 
ICD-9-CM: 401.9 Screening for colon cancer     ICD-10-CM: Z12.11 ICD-9-CM: V76.51 Gastroesophageal reflux disease without esophagitis     ICD-10-CM: K21.9 ICD-9-CM: 530.81   
 PUD (peptic ulcer disease)     ICD-10-CM: K27.9 ICD-9-CM: 533.90 Vitals BP Pulse Temp Resp Height(growth percentile) Weight(growth percentile) 166/84 (BP 1 Location: Right arm, BP Patient Position: Sitting) 61 98.4 °F (36.9 °C) (Oral) 16 5' 8.5\" (1.74 m) 168 lb 3.2 oz (76.3 kg) SpO2 BMI Smoking Status 99% 25.2 kg/m2 Heavy Tobacco Smoker Vitals History BMI and BSA Data Body Mass Index Body Surface Area  
 25.2 kg/m 2 1.92 m 2 Preferred Pharmacy Pharmacy Name Phone [facility]/PHARMACY #[id number] [facility] Sammy Wong [facility] [phone] Your Updated Medication List  
  
   
This list is accurate as of: 2/6/18  9:32 AM.  Always use your most recent med list. amLODIPine 5 mg tablet Commonly known as:  Tatiana Pique Take 1 Tab by mouth daily. Azelastine 0.15 % (205.5 mcg) nasal spray Commonly known as:  ASTEPRO  
USE 1-2 SPRAYS IN BOTH NOSTRILS ROUTE TWO (2) TIMES A DAY  
  
 clobetasol 0.05 % ointment Commonly known as:  Alessandro Burow Apply  to affected area two (2) times daily as needed for Skin Irritation. halobetasol 0.05 % ointment Commonly known as:  ULTRAVATE  
APPLY TO AFFECTED AREA TWICE A DAY AS NEEDED -AVOID FACE/ GROIN/ ARMPITS  
  
 mupirocin 2 % ointment Commonly known as:  BACTROBAN  
APPLY TO AFFECTED AREA 3 TIMES A DAY FOR 10 DAYS  
  
 olopatadine 0.6 % Spry Commonly known as:  PATANASE 2 Squirts by Both Nostrils route two (2) times daily as needed. Use instead of Azelastine nasal spray--not covered. pantoprazole 40 mg tablet Commonly known as:  PROTONIX  
TAKE 1 TAB BY MOUTH DAILY. * propranolol 80 mg tablet Commonly known as:  INDERAL Take 1 Tab by mouth two (2) times a day. Take with 40mg tab, for 120mg two times daily. * propranolol 40 mg tablet Commonly known as:  INDERAL Take 1 Tab by mouth two (2) times a day. Take with 80mg tab, for 120mg two times daily. terbinafine HCl 1 % topical cream  
Commonly known as:  LAMISIL Apply  to affected area two (2) times a day. Apply to rash in groin and armpits as directed, instead of steroid cream.  
  
 triamcinolone 0.1 % lotion Commonly known as:  KENALOG  
APPLY TO SCALP TWICE DAILY FOR FLAKING AND ITCH * Notice: This list has 2 medication(s) that are the same as other medications prescribed for you. Read the directions carefully, and ask your doctor or other care provider to review them with you. Prescriptions Sent to Pharmacy Refills  
 amLODIPine (NORVASC) 5 mg tablet 2 Sig: Take 1 Tab by mouth daily. Class: Normal  
 Pharmacy: [facility]/pharmacy #[id number] [place]  Ph #: [phone] Route: Oral  
 pantoprazole (PROTONIX) 40 mg tablet 2 Sig: TAKE 1 TAB BY MOUTH DAILY. Class: Normal  
 Pharmacy: [facility]/pharmacy [facility] Ph #: [phone]  
 propranolol (INDERAL) 80 mg tablet 5 Sig: Take 1 Tab by mouth two (2) times a day. Take with 40mg tab, for 120mg two times daily. Class: Normal  
 Pharmacy: [facility]/pharmacy #[id number] [place]  Ph #: [phone] Route: Oral  
 propranolol (INDERAL) 40 mg tablet 5 Sig: Take 1 Tab by mouth two (2) times a day. Take with 80mg tab, for 120mg two times daily. Class: Normal  
 [id number] [place]  Ph #: [phone] Route: Oral  
  
We Performed the Following REFERRAL TO GASTROENTEROLOGY [IQL21 Custom] Comments:  
 Please evaluate patient for screening colonoscopy and GERD/PPI therapy (possible EGD). Follow-up Instructions Return in about 2 weeks (around 2/20/2018) for Blood Pressure follow-up, referral follow-up--2-4weeks. Referral Information Referral ID Referred By Referred To  
  
 [id number] Hebert PALOMINO MD   
   [address] [place] [address] [address] [place] [address], [address] Phone: [phone] Fax: [phone] Visits Status Start Date End Date 1 New Request 2/6/18 2/6/19 If your referral has a status of pending review or denied, additional information will be sent to support the outcome of this decision. Patient Instructions 1. Add the amlodipine to your current propranolol. Although you take the propranolol two times daily, you only need to take the amlodipine once daily. You can add the amlodipine to either the morning or the evening dose of the propranolol. 2.  Please follow the following instructions to process/authorize your referral, as reviewed today: 
 
Referrals processing Please verify with your insurance IF you need referral authorization submitted. For insurance plans which require this, please follow the following steps. FAILURE TO DO SO MAY RESULT IN INABILITY TO SEE THE SPECIALIST YOU HAVE BEEN REFERRED TO.  
1. Call and schedule appointment with specialist 
2. Call our clinic and leave message with provider name, and date of appointment 3. We will then submit the referral to your insurance. This process takes 2-5 business days. If you have questions about scheduling or authorizing referral, you can review with our referral coordinators Adriane Choudhary or Dell Thornton) at the . You can review with them today if available/if you have time, or you can call to review with them once you have made your referral/appointment. If you are not sure if you need referral authorizations, please review with the referral coordinators, either prior to or after you have made the appointment, as reviewed. Introducing [facility] & HEALTH SERVICES! [facility] introduces "[employer] patient portal. Now you can access parts of your medical record, email your doctor's office, and request medication refills online. 1. In your internet browser, go to [email or web address] 2. Click on the First Time User? Click Here link in the Sign In box. You will see the New Member Sign Up page. 3. Enter your "[employer] Access Code exactly as it appears below. You will not need to use this code after youve completed the sign-up process. If you do not sign up before the expiration date, you must request a new code. · "[employer] Access Code: [email or web address] Expires: 5/7/2018  9:30 AM 
 
4. Enter the last four digits of your Social Security Number (xxxx) and Date of Birth (mm/dd/yyyy) as indicated and click Submit. You will be taken to the next sign-up page. 5. Create a [employer] ID. This will be your [employer] login ID and cannot be changed, so think of one that is secure and easy to remember. 6. Create a [employer] password. You can change your password at any time. 7. Enter your Password Reset Question and Answer. This can be used at a later time if you forget your password. 8. Enter your e-mail address. You will receive e-mail notification when new information is available in [address] 9. Click Sign Up. You can now view and download portions of your medical record. 10. Click the Download Summary menu link to download a portable copy of your medical information. If you have questions, please visit the Frequently Asked Questions section of the [employer] website. Remember, [employer] is NOT to be used for urgent needs. For medical emergencies, dial 911. Now available from your iPhone and Android! Please provide this summary of care documentation to your next provider. Your primary care clinician is listed as [address]. If you have any questions after today's visit, please call [phone].

## 2018-02-07 ENCOUNTER — TELEPHONE (OUTPATIENT)
Dept: INTERNAL MEDICINE CLINIC | Age: 68
End: 2018-02-07

## 2018-02-07 NOTE — TELEPHONE ENCOUNTER
Patient should contact insurance to see what providers are covered and if referral is needed we can provide based on the name he gives. Please call patient.

## 2018-02-07 NOTE — TELEPHONE ENCOUNTER
Patient would like to be referred to another gasto provider due to the one he was referred to is out of network. Please advise.  [phone]

## 2018-02-09 NOTE — TELEPHONE ENCOUNTER
Patient called following up on referral request. Informed patient that he needed to reach out to his insurance to find providers that are in network. Patient states that he is not willing to call his insurance because \"it is the referral coordinator's job to reach out to insurances for patients\". Explained the process of what patient needed to do. Patient states he is not willing to go to specialty provider without someone finding another doctor for him.

## 2018-05-22 ENCOUNTER — OFFICE VISIT (OUTPATIENT)
Dept: INTERNAL MEDICINE CLINIC | Age: 68
End: 2018-05-22

## 2018-05-22 VITALS
BODY MASS INDEX: 25.07 KG/M2 | DIASTOLIC BLOOD PRESSURE: 69 MMHG | TEMPERATURE: 98.2 F | OXYGEN SATURATION: 96 % | HEART RATE: 76 BPM | SYSTOLIC BLOOD PRESSURE: 115 MMHG | RESPIRATION RATE: 18 BRPM | WEIGHT: 165.38 LBS | HEIGHT: 68 IN

## 2018-05-22 DIAGNOSIS — Z00.00 WELL ADULT HEALTH CHECK: ICD-10-CM

## 2018-05-22 DIAGNOSIS — K21.9 GASTROESOPHAGEAL REFLUX DISEASE WITHOUT ESOPHAGITIS: ICD-10-CM

## 2018-05-22 DIAGNOSIS — I10 ESSENTIAL HYPERTENSION: Primary | ICD-10-CM

## 2018-05-22 RX ORDER — PROPRANOLOL HYDROCHLORIDE 80 MG/1
80 TABLET ORAL 2 TIMES DAILY
Qty: 60 TAB | Refills: 5 | Status: SHIPPED | OUTPATIENT
Start: 2018-05-22 | End: 2018-09-21 | Stop reason: SDUPTHER

## 2018-05-22 RX ORDER — AMLODIPINE BESYLATE 5 MG/1
TABLET ORAL
Qty: 30 TAB | Refills: 5 | Status: SHIPPED | OUTPATIENT
Start: 2018-05-22 | End: 2018-09-21 | Stop reason: SDUPTHER

## 2018-05-22 RX ORDER — PROPRANOLOL HYDROCHLORIDE 40 MG/1
40 TABLET ORAL 2 TIMES DAILY
Qty: 60 TAB | Refills: 5 | Status: SHIPPED | OUTPATIENT
Start: 2018-05-22 | End: 2018-09-21 | Stop reason: SDUPTHER

## 2018-05-22 NOTE — PATIENT INSTRUCTIONS
1.  Please call to re-schedule your GI appt. Sean Peña MD  [facility]  [facility] [facility]  [address] [address]  Phone: [phone]      2. If problems with transportation, you can call Kendall Martinez LPN, our Panel Manager here for assistance (care you were given today at visit).

## 2018-05-22 NOTE — MR AVS SNAPSHOT
[address] [place] [address] Shayy Harrell [address] 
[phone] Patient: Jack Seaman MRN: [id number] KJL:7/70/4446 Visit Information Date & Time Provider Department Dept. Phone Encounter #  
 5/22/2018 11:30 AM Yeimi Mcgovern, [address] [place] [address] and Internal Medicine [phone] [id number] Follow-up Instructions Return in about 4 months (around 9/22/2018) for Blood Pressure follow-up, referral follow-up. Upcoming Health Maintenance Date Due Hepatitis C Screening 1950 DTaP/Tdap/Td series (1 - Tdap) 5/17/1969 FOBT Q 1 YEAR AGE 50-75 5/17/1998 ZOSTER VACCINE AGE 60> 3/17/2008 GLAUCOMA SCREENING Q2Y 5/17/2013 Pneumococcal 65+ Low/Medium Risk (1 of 2 - PCV13) 5/17/2013 Influenza Age 5 to Adult 8/1/2018 Allergies as of 5/22/2018  Review Complete On: 5/22/2018 By: Yeimi Mcgovern MD  
 No Known Allergies Current Immunizations  Never Reviewed No immunizations on file. Not reviewed this visit You Were Diagnosed With   
  
 Codes Comments Essential hypertension    -  Primary ICD-10-CM: I10 
ICD-9-CM: 401.9 Well adult health check     ICD-10-CM: Z00.00 ICD-9-CM: V70.0 Vitals BP Pulse Temp Resp Height(growth percentile) Weight(growth percentile)  
 115/69 (BP 1 Location: Left arm, BP Patient Position: Sitting) 76 98.2 °F (36.8 °C) (Oral) 18 5' 8\" (1.727 m) 165 lb 6 oz (75 kg) SpO2 BMI Smoking Status 96% 25.15 kg/m2 Heavy Tobacco Smoker BMI and BSA Data Body Mass Index Body Surface Area  
 25.15 kg/m 2 1.9 m 2 Preferred Pharmacy Pharmacy Name Phone [facility]/PHARMACY #[id number]/ [facility] [phone] Your Updated Medication List  
  
   
This list is accurate as of 5/22/18 12:43 PM.  Always use your most recent med list. amLODIPine 5 mg tablet Commonly known as:  Key Susan TAKE 1 TABLET BY MOUTH EVERY DAY Azelastine 0.15 % (205.5 mcg) nasal spray Commonly known as:  ASTEPRO  
USE 1-2 SPRAYS IN BOTH NOSTRILS ROUTE TWO (2) TIMES A DAY  
  
 clobetasol 0.05 % ointment Commonly known as:  Milena Mangle Apply  to affected area two (2) times daily as needed for Skin Irritation. halobetasol 0.05 % ointment Commonly known as:  ULTRAVATE  
APPLY TO AFFECTED AREA TWICE A DAY AS NEEDED -AVOID FACE/ GROIN/ ARMPITS  
  
 mupirocin 2 % ointment Commonly known as:  BACTROBAN  
APPLY TO AFFECTED AREA 3 TIMES A DAY FOR 10 DAYS  
  
 olopatadine 0.6 % Spry Commonly known as:  PATANASE 2 Squirts by Both Nostrils route two (2) times daily as needed. Use instead of Azelastine nasal spray--not covered. pantoprazole 40 mg tablet Commonly known as:  PROTONIX  
TAKE 1 TABLET BY MOUTH EVERY DAY * propranolol 40 mg tablet Commonly known as:  INDERAL Take 1 Tab by mouth two (2) times a day. Take with 80mg tab, for 120mg two times daily. * propranolol 80 mg tablet Commonly known as:  INDERAL Take 1 Tab by mouth two (2) times a day. Take with 40mg tab, for 120mg two times daily. terbinafine HCl 1 % topical cream  
Commonly known as:  LAMISIL Apply  to affected area two (2) times a day. Apply to rash in groin and armpits as directed, instead of steroid cream.  
  
 triamcinolone 0.1 % lotion Commonly known as:  KENALOG  
APPLY TO SCALP TWICE DAILY FOR FLAKING AND ITCH * Notice: This list has 2 medication(s) that are the same as other medications prescribed for you. Read the directions carefully, and ask your doctor or other care provider to review them with you. Prescriptions Sent to Pharmacy Refills  
 amLODIPine (NORVASC) 5 mg tablet 5 Sig: TAKE 1 TABLET BY MOUTH EVERY DAY  Class: Normal  
 Pharmacy: [address], [place]  [place] Ph #: [phone]  
 propranolol (INDERAL) 40 mg tablet 5 Sig: Take 1 Tab by mouth two (2) times a day. Take with 80mg tab, for 120mg two times daily. Class: Normal  
 [id number] [place]  Ph #: [phone] Route: Oral  
 propranolol (INDERAL) 80 mg tablet 5 Sig: Take 1 Tab by mouth two (2) times a day. Take with 40mg tab, for 120mg two times daily. Class: Normal  
 [id number] [address] Ph #: [phone] Route: Oral  
  
We Performed the Following CBC WITH AUTOMATED DIFF [81007 CPT(R)] HEMOGLOBIN A1C WITH EAG [17356 CPT(R)] METABOLIC PANEL, COMPREHENSIVE [22506 CPT(R)] PSA W/ REFLX FREE PSA [31802 CPT(R)] Follow-up Instructions Return in about 4 months (around 9/22/2018) for Blood Pressure follow-up, referral follow-up. Patient Instructions 1. Please call to re-schedule your GI appt. Felicity Rich MD 
[facility] [address] [place] [address] [address] [place] [address] Phone: [phone] 2. If problems with transportation, you can call Venice Goldmann, LPN, our Panel Manager here for assistance (care you were given today at visit). Introducing [facility] & HEALTH SERVICES! [employer] introduces [employer] patient portal. Now you can access parts of your medical record, email your doctor's office, and request medication refills online. 1. In your internet browser, go to [email or web address] 2. Click on the First Time User? Click Here link in the Sign In box. You will see the New Member Sign Up page. 3. Enter your [employer] Access Code exactly as it appears below. You will not need to use this code after youve completed the sign-up process. If you do not sign up before the expiration date, you must request a new code. · [employer] Access Code: [email or web address] Expires: 8/20/2018 12:38 PM 
 
4. Enter the last four digits of your Social Security Number (xxxx) and Date of Birth (mm/dd/yyyy) as indicated and click Submit. You will be taken to the next sign-up page. 5. Create a "[employer]" ID. This will be your "[employer]" login ID and cannot be changed, so think of one that is secure and easy to remember. 6. Create a "[employer]" password. You can change your password at any time. 7. Enter your Password Reset Question and Answer. This can be used at a later time if you forget your password. 8. Enter your e-mail address. You will receive e-mail notification when new information is available in [address] 9. Click Sign Up. You can now view and download portions of your medical record. 10. Click the Download Summary menu link to download a portable copy of your medical information. If you have questions, please visit the Frequently Asked Questions section of the "[employer]" website. Remember, "[employer]" is NOT to be used for urgent needs. For medical emergencies, dial 911. Now available from your iPhone and Android! Please provide this summary of care documentation to your next provider. Your primary care clinician is listed as [address]. If you have any questions after today's visit, please call [phone].

## 2018-05-22 NOTE — PROGRESS NOTES
History of Present Illness:   Peter Dennis is a 77 y.o. male here for evaluation:    Chief Complaint   Patient presents with    Blood Pressure Check     Here for BP check--requested as due and needed refills. Referred to GI again last visit Feb 2018. He notes has not seen GI yet--notes he scheduled appt, but couldn't get ride to see. He scheduled transportation but they were late, so missed GI appt. He is interested in colonoscopy for colon cancer screening. Reviewed same GI provider can do once seen to eval GERD and need for EGD screening. He will re-schedule with GI. Referral info given again at visit. Transportation reviewed with pt. He was given Panel Mgr's card for contact if needed (as per instructions). Notes doing well with amlodipine addition. He takes with Inderal and notes amlodipine also helps him sleep. Refills of BP meds reviewed today. He has pantoprazole from last refill. Reviewed would refill once seen by GI, or again to bridge if GI eval delayed as above. Planned prostate exam today. He agrees/prefers to do today. He is not fasting for labs. He prefers to do non-fasting labs today. Prior LDL mildly elevated on labs from June 2017 visit. Prior to Admission medications    Medication Sig Start Date End Date Taking?  Authorizing Provider   amLODIPine (NORVASC) 5 mg tablet TAKE 1 TABLET BY MOUTH EVERY DAY 5/12/18  Yes Demario Gruber MD   pantoprazole (PROTONIX) 40 mg tablet TAKE 1 TABLET BY MOUTH EVERY DAY 5/12/18  Yes Demario Gruber MD   Azelastine (ASTEPRO) 0.15 % (205.5 mcg) nasal spray USE 1-2 SPRAYS IN BOTH NOSTRILS ROUTE TWO (2) TIMES A DAY 1/29/18  Yes Historical Provider   halobetasol (ULTRAVATE) 0.05 % ointment APPLY TO AFFECTED AREA TWICE A DAY AS NEEDED -AVOID FACE/ GROIN/ ARMPITS 1/29/18  Yes Historical Provider   triamcinolone (KENALOG) 0.1 % lotion APPLY TO SCALP TWICE DAILY FOR FLAKING AND DEKALB [facility] 12/28/17  Yes Historical Provider   propranolol (INDERAL) 80 mg tablet Take 1 Tab by mouth two (2) times a day. Take with 40mg tab, for 120mg two times daily. 2/6/18  Yes Geraldo Taylor MD   propranolol (INDERAL) 40 mg tablet Take 1 Tab by mouth two (2) times a day. Take with 80mg tab, for 120mg two times daily. 2/6/18  Yes Geraldo Taylor MD   olopatadine (PATANASE) 0.6 % spry 2 Squirts by Both Nostrils route two (2) times daily as needed. Use instead of Azelastine nasal spray--not covered. 6/16/17  Yes Geraldo Taylor MD                                ROS    Vitals:    05/22/18 1141   BP: 115/69   Pulse: 76   Resp: 18   Temp: 98.2 °F (36.8 °C)   TempSrc: Oral   SpO2: 96%   Weight: 165 lb 6 oz (75 kg)   Height: 5' 8\" (1.727 m)   PainSc:   0 - No pain      Body mass index is 25.15 kg/(m^2). Physical Exam:     Physical Exam   Constitutional: He appears well-developed and well-nourished. No distress. HENT:   Head: Normocephalic and atraumatic. Eyes: Conjunctivae are normal. Right eye exhibits no discharge. Left eye exhibits no discharge. No scleral icterus. Cardiovascular: Normal rate, regular rhythm, normal heart sounds and intact distal pulses. Exam reveals no gallop and no friction rub. No murmur heard. Pulmonary/Chest: Effort normal and breath sounds normal. No respiratory distress. He has no wheezes. He has no rales. Abdominal: Soft. Bowel sounds are normal. He exhibits no distension. There is no tenderness. Genitourinary: Rectum normal and prostate normal. Rectal exam shows guaiac negative stool. Genitourinary Comments: Prostate smooth, symmetric, without tenderness or nodules. Normal to slightly enlarged. No external hemorrhoids noted on KAREN. No pain with exam or internal hemorrhoids noted. No blood on glove. Heme negative light brown stool. Minimal stool on glove. Musculoskeletal: He exhibits no edema, tenderness or deformity. Neurological: He is alert. He exhibits normal muscle tone. Coordination normal.   Skin: Skin is warm. No rash noted. He is not diaphoretic. No erythema. No pallor. Psychiatric: He has a normal mood and affect. His behavior is normal. Judgment and thought content normal.       Assessment and Plan:       ICD-10-CM ICD-9-CM    1. Essential hypertension I10 401.9 amLODIPine (NORVASC) 5 mg tablet      propranolol (INDERAL) 40 mg tablet      propranolol (INDERAL) 80 mg tablet   2. Well adult health check Z00.00 V70.0 CBC WITH AUTOMATED DIFF      METABOLIC PANEL, COMPREHENSIVE      HEMOGLOBIN A1C WITH EAG      PSA W/ REFLX FREE PSA   3. Gastroesophageal reflux disease without esophagitis K21.9 530.81        1. Refills reviewed at visit. 2.  Non-fasting labs reviewed. Normal prostate exam at visit. 3.  GI referral reviewed. Transportation problems and resources reviewed. Reviewed with pt at visit, and messaged Panel Mgr after visit. Follow-up Disposition:  Return in about 4 months (around 9/22/2018) for Blood Pressure follow-up, referral follow-up.  lab results and schedule of future lab studies reviewed with patient  reviewed medications and side effects in detail    For additional documentation of information and/or recommendations discussed this visit, please see notes in instructions. Plan and evaluation (above) reviewed with pt at visit  Patient voiced understanding of plan and provided with time to ask/review questions. After Visit Summary (AVS) provided to pt after visit with additional instructions as needed/reviewed.

## 2018-05-22 NOTE — PROGRESS NOTES
RM 18    Patient is not fasting. Chief Complaint   Patient presents with    Blood Pressure Check       1. Have you been to the ER, urgent care clinic since your last visit? Hospitalized since your last visit? No    2. Have you seen or consulted any other health care providers outside of the [address] [place] [address] since your last visit? Include any pap smears or colon screening. No    Health Maintenance Due   Topic Date Due    Hepatitis C Screening  1950    DTaP/Tdap/Td series (1 - Tdap) 05/17/1969    FOBT Q 1 YEAR AGE 50-75  05/17/1998    ZOSTER VACCINE AGE 60>  03/17/2008    GLAUCOMA SCREENING Q2Y  05/17/2013    Pneumococcal 65+ Low/Medium Risk (1 of 2 - PCV13) 05/17/2013     Eye exam not up to date per patient. Fall Risk Assessment, last 12 mths 5/22/2018   Able to walk? Yes   Fall in past 12 months?  No       PHQ over the last two weeks 5/22/2018   Little interest or pleasure in doing things Not at all   Feeling down, depressed or hopeless Not at all   Total Score PHQ 2 0

## 2018-05-23 LAB
ALBUMIN SERPL-MCNC: 3.7 G/DL (ref 3.5–4.8)
ALBUMIN/GLOB SERPL: 1.2 {RATIO} (ref 1.2–2.2)
ALP SERPL-CCNC: 62 IU/L (ref 39–117)
ALT SERPL-CCNC: 7 IU/L (ref 0–44)
AST SERPL-CCNC: 15 IU/L (ref 0–40)
BASOPHILS # BLD AUTO: 0.1 X10E3/UL (ref 0–0.2)
BASOPHILS NFR BLD AUTO: 1 %
BILIRUB SERPL-MCNC: 0.3 MG/DL (ref 0–1.2)
BUN SERPL-MCNC: 15 MG/DL (ref 8–27)
BUN/CREAT SERPL: 14 (ref 10–24)
CALCIUM SERPL-MCNC: 8.9 MG/DL (ref 8.6–10.2)
CHLORIDE SERPL-SCNC: 101 MMOL/L (ref 96–106)
CO2 SERPL-SCNC: 23 MMOL/L (ref 18–29)
CREAT SERPL-MCNC: 1.07 MG/DL (ref 0.76–1.27)
EOSINOPHIL # BLD AUTO: 0.1 X10E3/UL (ref 0–0.4)
EOSINOPHIL NFR BLD AUTO: 2 %
ERYTHROCYTE [DISTWIDTH] IN BLOOD BY AUTOMATED COUNT: 13.4 % (ref 12.3–15.4)
EST. AVERAGE GLUCOSE BLD GHB EST-MCNC: 123 MG/DL
GLOBULIN SER CALC-MCNC: 3.1 G/DL (ref 1.5–4.5)
GLUCOSE SERPL-MCNC: 101 MG/DL (ref 65–99)
HBA1C MFR BLD: 5.9 % (ref 4.8–5.6)
HCT VFR BLD AUTO: 43.3 % (ref 37.5–51)
HGB BLD-MCNC: 14.5 G/DL (ref 13–17.7)
IMM GRANULOCYTES # BLD: 0 X10E3/UL (ref 0–0.1)
IMM GRANULOCYTES NFR BLD: 0 %
LYMPHOCYTES # BLD AUTO: 1.6 X10E3/UL (ref 0.7–3.1)
LYMPHOCYTES NFR BLD AUTO: 25 %
MCH RBC QN AUTO: 31.7 PG (ref 26.6–33)
MCHC RBC AUTO-ENTMCNC: 33.5 G/DL (ref 31.5–35.7)
MCV RBC AUTO: 95 FL (ref 79–97)
MONOCYTES # BLD AUTO: 0.5 X10E3/UL (ref 0.1–0.9)
MONOCYTES NFR BLD AUTO: 8 %
NEUTROPHILS # BLD AUTO: 4 X10E3/UL (ref 1.4–7)
NEUTROPHILS NFR BLD AUTO: 64 %
PLATELET # BLD AUTO: 266 X10E3/UL (ref 150–379)
POTASSIUM SERPL-SCNC: 4.9 MMOL/L (ref 3.5–5.2)
PROT SERPL-MCNC: 6.8 G/DL (ref 6–8.5)
PSA SERPL-MCNC: 1 NG/ML (ref 0–4)
RBC # BLD AUTO: 4.58 X10E6/UL (ref 4.14–5.8)
REFLEX CRITERIA: NORMAL
SODIUM SERPL-SCNC: 137 MMOL/L (ref 134–144)
WBC # BLD AUTO: 6.2 X10E3/UL (ref 3.4–10.8)

## 2018-08-13 DIAGNOSIS — K21.9 GASTROESOPHAGEAL REFLUX DISEASE WITHOUT ESOPHAGITIS: ICD-10-CM

## 2018-08-13 DIAGNOSIS — K27.9 PUD (PEPTIC ULCER DISEASE): ICD-10-CM

## 2018-08-13 RX ORDER — PANTOPRAZOLE SODIUM 40 MG/1
TABLET, DELAYED RELEASE ORAL
Qty: 30 TAB | Refills: 2 | Status: SHIPPED | OUTPATIENT
Start: 2018-08-13 | End: 2018-11-15 | Stop reason: SDUPTHER

## 2018-08-14 NOTE — TELEPHONE ENCOUNTER
Refill request(s) approved--pantoprazole. Please clarify with pt if he has seen GI yet; and/or request records from Dr. Meg Nolan.     Future Appointments  Date Time Provider Floridalma Che   9/21/2018 1:45 PM Swapnil Vee MD [address] [facility]

## 2018-08-16 NOTE — TELEPHONE ENCOUNTER
After verifying patient's name and , writer notified patient rx was sent to pharmacy. Patient states he has not seen GI or have any upcoming appts with GI. No further concerns.

## 2018-08-31 ENCOUNTER — PATIENT OUTREACH (OUTPATIENT)
Dept: INTERNAL MEDICINE CLINIC | Age: 68
End: 2018-08-31

## 2018-08-31 NOTE — PROGRESS NOTES
Outgoing call made to patient. Calling to assist with obtaining a GI appointment and scheduling colonoscopy. No answer will attempt to contact again.

## 2018-09-17 DIAGNOSIS — J30.89 ENVIRONMENTAL AND SEASONAL ALLERGIES: ICD-10-CM

## 2018-09-19 RX ORDER — AZELASTINE HCL 205.5 UG/1
1-2 SPRAY NASAL
Qty: 30 ML | Refills: 5 | Status: SHIPPED | OUTPATIENT
Start: 2018-09-19 | End: 2019-06-17 | Stop reason: SDUPTHER

## 2018-09-19 NOTE — TELEPHONE ENCOUNTER
LOV  May 2018.     Future Appointments  Date Time Provider Floridalma Bolton   9/21/2018 1:45 PM MD NEIDA Lerma SCHED       Refill request(s) approved--azelastine nasal.

## 2018-09-21 ENCOUNTER — OFFICE VISIT (OUTPATIENT)
Dept: INTERNAL MEDICINE CLINIC | Age: 68
End: 2018-09-21

## 2018-09-21 VITALS
HEART RATE: 78 BPM | HEIGHT: 68 IN | DIASTOLIC BLOOD PRESSURE: 72 MMHG | RESPIRATION RATE: 16 BRPM | SYSTOLIC BLOOD PRESSURE: 113 MMHG | TEMPERATURE: 97.7 F | WEIGHT: 159.13 LBS | BODY MASS INDEX: 24.12 KG/M2 | OXYGEN SATURATION: 96 %

## 2018-09-21 DIAGNOSIS — I10 ESSENTIAL HYPERTENSION: Primary | ICD-10-CM

## 2018-09-21 DIAGNOSIS — Z23 ENCOUNTER FOR IMMUNIZATION: ICD-10-CM

## 2018-09-21 DIAGNOSIS — K21.9 GASTROESOPHAGEAL REFLUX DISEASE, ESOPHAGITIS PRESENCE NOT SPECIFIED: ICD-10-CM

## 2018-09-21 DIAGNOSIS — Z12.11 SCREENING FOR COLON CANCER: ICD-10-CM

## 2018-09-21 RX ORDER — AMLODIPINE BESYLATE 5 MG/1
TABLET ORAL
Qty: 30 TAB | Refills: 5 | Status: SHIPPED | OUTPATIENT
Start: 2018-09-21 | End: 2019-02-28 | Stop reason: SDUPTHER

## 2018-09-21 RX ORDER — PROPRANOLOL HYDROCHLORIDE 40 MG/1
40 TABLET ORAL 2 TIMES DAILY
Qty: 60 TAB | Refills: 5 | Status: SHIPPED | OUTPATIENT
Start: 2018-09-21 | End: 2019-02-28 | Stop reason: SDUPTHER

## 2018-09-21 RX ORDER — PROPRANOLOL HYDROCHLORIDE 80 MG/1
80 TABLET ORAL 2 TIMES DAILY
Qty: 60 TAB | Refills: 5 | Status: SHIPPED | OUTPATIENT
Start: 2018-09-21 | End: 2019-02-28 | Stop reason: SDUPTHER

## 2018-09-21 NOTE — MR AVS SNAPSHOT
[address] [place] [address] [address] [place] [address] 
[phone] Patient: Reji Conley MRN: [id number]:[id number] Visit Information Date & Time Provider Department Dept. Phone Encounter #  
 9/21/2018  1:45 PM Gianna Mosquera and Internal Medicine [phone] Follow-up Instructions Return in about 3 months (around 12/21/2018) for Blood Pressure follow-up, referral follow-up--3-4mo. Upcoming Health Maintenance Date Due Hepatitis C Screening 1950 DTaP/Tdap/Td series (1 - Tdap) 5/17/1969 FOBT Q 1 YEAR AGE 50-75 5/17/1998 ZOSTER VACCINE AGE 60> 3/17/2008 GLAUCOMA SCREENING Q2Y 5/17/2013 Pneumococcal 65+ Low/Medium Risk (1 of 2 - PCV13) 5/17/2013 Influenza Age 5 to Adult 8/1/2018 Allergies as of 9/21/2018  Review Complete On: 9/21/2018 By: Devyn Talamantes MD  
 No Known Allergies Current Immunizations  Reviewed on 9/21/2018 Name Date Influenza Vaccine (Tri) Adjuvanted  Incomplete Reviewed by Devyn Talamantes MD on 9/21/2018 at  2:28 PM  
 Reviewed by Devyn Talamantes MD on 9/21/2018 at  2:29 PM  
You Were Diagnosed With   
  
 Codes Comments Essential hypertension    -  Primary ICD-10-CM: I10 
ICD-9-CM: 401.9 Encounter for immunization     ICD-10-CM: X96 ICD-9-CM: V03.89 Gastroesophageal reflux disease, esophagitis presence not specified     ICD-10-CM: K21.9 ICD-9-CM: 530.81 Screening for colon cancer     ICD-10-CM: Z12.11 ICD-9-CM: V76.51 Vitals BP Pulse Temp Resp Height(growth percentile) Weight(growth percentile) 113/72 (BP 1 Location: Left arm, BP Patient Position: Sitting) 78 97.7 °F (36.5 °C) (Oral) 16 5' 8\" (1.727 m) 159 lb 2 oz (72.2 kg) SpO2 BMI Smoking Status 96% 24.19 kg/m2 Heavy Tobacco Smoker BMI and BSA Data Body Mass Index Body Surface Area 24.19 kg/m 2 1.86 m 2 Preferred Pharmacy Pharmacy Name Phone [phone] Your Updated Medication List  
  
   
This list is accurate as of 9/21/18  2:32 PM.  Always use your most recent med list. amLODIPine 5 mg tablet Commonly known as:  Mando Marc TAKE 1 TABLET BY MOUTH EVERY DAY Azelastine 0.15 % (205.5 mcg) nasal spray Commonly known as:  ASTEPRO  
1-2 Sprays by Both Nostrils route two (2) times daily as needed. For allergies/congestion. halobetasol 0.05 % ointment Commonly known as:  ULTRAVATE  
APPLY TO AFFECTED AREA TWICE A DAY AS NEEDED -AVOID FACE/ GROIN/ ARMPITS  
  
 olopatadine 0.6 % Spry Commonly known as:  PATANASE 2 Squirts by Both Nostrils route two (2) times daily as needed. Use instead of Azelastine nasal spray--not covered. pantoprazole 40 mg tablet Commonly known as:  PROTONIX  
TAKE 1 TABLET BY MOUTH EVERY DAY * propranolol 40 mg tablet Commonly known as:  INDERAL Take 1 Tab by mouth two (2) times a day. Take with 80mg tab, for 120mg two times daily. * propranolol 80 mg tablet Commonly known as:  INDERAL Take 1 Tab by mouth two (2) times a day. Take with 40mg tab, for 120mg two times daily. triamcinolone 0.1 % lotion Commonly known as:  KENALOG  
APPLY TO SCALP TWICE DAILY FOR FLAKING AND ITCH  
  
 TYLENOL PM PO Take  by mouth. * Notice: This list has 2 medication(s) that are the same as other medications prescribed for you. Read the directions carefully, and ask your doctor or other care provider to review them with you. Prescriptions Sent to Pharmacy Refills  
 amLODIPine (NORVASC) 5 mg tablet 5 Sig: TAKE 1 TABLET BY MOUTH EVERY DAY Class: Normal  
 Pharmacy: [facility]/pharmacy #[id number] Robert WIN  #: [phone] propranolol (INDERAL) 40 mg tablet 5 Sig: Take 1 Tab by mouth two (2) times a day. Take with 80mg tab, for 120mg two times daily. Class: Normal  
 Pharmacy: [facility]/pharmacy #[id number]  [address] Ph #: [phone] Route: Oral  
 propranolol (INDERAL) 80 mg tablet 5 Sig: Take 1 Tab by mouth two (2) times a day. Take with 40mg tab, for 120mg two times daily. Class: Normal  
 Pharmacy: [facility]/pharmacy #[id number]  Robert FLYNN [facility] Ph #: [phone] Route: Oral  
  
We Performed the Following INFLUENZA VACCINE INACTIVATED (IIV), SUBUNIT, ADJUVANTED, IM [id number] CPT(R)] Follow-up Instructions Return in about 3 months (around 12/21/2018) for Blood Pressure follow-up, referral follow-up--3-4mo. Introducing [facility]! [facility] introduces Producteev patient portal. Now you can access parts of your medical record, email your doctor's office, and request medication refills online. 1. In your internet browser, go to [email or web address] 2. Click on the First Time User? Click Here link in the Sign In box. You will see the New Member Sign Up page. 3. Enter your Producteev Access Code exactly as it appears below. You will not need to use this code after youve completed the sign-up process. If you do not sign up before the expiration date, you must request a new code. · Producteev Access Code: [email or web address] Expires: 12/20/2018  1:36 PM 
 
4. Enter the last four digits of your Social Security Number (xxxx) and Date of Birth (mm/dd/yyyy) as indicated and click Submit. You will be taken to the next sign-up page. 5. Create a GTIt ID. This will be your GTIt login ID and cannot be changed, so think of one that is secure and easy to remember. 6. Create a GTIt password. You can change your password at any time. 7. Enter your Password Reset Question and Answer. This can be used at a later time if you forget your password. 8. Enter your e-mail address. You will receive e-mail notification when new information is available in [address] 9. Click Sign Up. You can now view and download portions of your medical record. 10. Click the Download Summary menu link to download a portable copy of your medical information. If you have questions, please visit the Frequently Asked Questions section of the ReliOn website. Remember, ReliOn is NOT to be used for urgent needs. For medical emergencies, dial 911. Now available from your iPhone and Android! Please provide this summary of care documentation to your next provider. Your primary care clinician is listed as [address]. If you have any questions after today's visit, please call [phone].

## 2018-09-21 NOTE — PROGRESS NOTES
History of Present Illness:  
Altaf Villalobos is a 77 y.o. male here for evaluation: Chief Complaint Patient presents with  Blood Pressure Check  
  followup  Referral Follow Up Here for BP follow-up and GI referral follow-up. He notes he has not seen GI. He had stated prior had gone but was late with ride and not seen. He clarified today he did not ever go--when ride was late, he just didn't go. He also noted that he thought provider would not take his Medicaid insurance. Reviewed with pt and with referral coordinator that they would typically take his insurance. He is able to meet with referral coordinator and did so to coordinate referral after visit. He noted at visit may not want to do colonoscopy. Once explained rationale for screening and benefit, he decided he would prefer to do/consider doing. Reviewed whether or not he wants to do colonoscopy, he should still see for history ulcers and GERD for pantoprazole/PPI rec's. He notes no problems on current dose PPI--40mg Protonix daily. He is doing well with other meds/BP meds. Refills reviewed today. He notes needs refills on psoriasis meds. Seeing derm provider at [address] [place] [address] to manage. Reviewed he should have pharmacy route those to his derm provider when ready/needed. Reviewed he should keep derm at [address] [place] [address] for psoriasis mgt. If interested in community derm provider, reviewed he should make sure he has alternative prior to leaving [address] [place] [address] provider. Prior to Admission medications Medication Sig Start Date End Date Taking? Authorizing Provider  
acetaminophen/diphenhydramine (TYLENOL PM PO) Take  by mouth. Yes Historical Provider Azelastine (ASTEPRO) 0.15 % (205.5 mcg) nasal spray 1-2 Sprays by Both Nostrils route two (2) times daily as needed. For allergies/congestion.  9/19/18  Yes Jagdeep Lucio MD  
pantoprazole (PROTONIX) 40 mg tablet TAKE 1 TABLET BY MOUTH EVERY DAY 8/13/18  Yes Dimas Quick MD  
amLODIPine (NORVASC) 5 mg tablet TAKE 1 TABLET BY MOUTH EVERY DAY 5/22/18  Yes Dimas Quick MD  
propranolol (INDERAL) 40 mg tablet Take 1 Tab by mouth two (2) times a day. Take with 80mg tab, for 120mg two times daily. 5/22/18  Yes Dimas Quick MD  
propranolol (INDERAL) 80 mg tablet Take 1 Tab by mouth two (2) times a day. Take with 40mg tab, for 120mg two times daily. 5/22/18  Yes Dimas Quick MD  
halobetasol (ULTRAVATE) 0.05 % ointment APPLY TO AFFECTED AREA TWICE A DAY AS NEEDED -AVOID FACE/ GROIN/ ARMPITS 1/29/18  Yes Historical Provider  
olopatadine (PATANASE) 0.6 % spry 2 Squirts by Both Nostrils route two (2) times daily as needed. Use instead of Azelastine nasal spray--not covered. 6/16/17  Yes Dimas Quick MD  
triamcinolone (KENALOG) 0.1 % lotion APPLY TO SCALP TWICE DAILY FOR FLAKING AND [facility] 12/28/17   Historical Provider ROS Vitals:  
 09/21/18 1353 BP: 113/72 Pulse: 78 Resp: 16 Temp: 97.7 °F (36.5 °C) TempSrc: Oral  
SpO2: 96% Weight: 159 lb 2 oz (72.2 kg) Height: 5' 8\" (1.727 m) PainSc:   0 - No pain Body mass index is 24.19 kg/(m^2). Physical Exam:  
 
Physical Exam  
Constitutional: He appears well-developed and well-nourished. No distress. HENT:  
Head: Normocephalic and atraumatic. Eyes: Conjunctivae are normal. Right eye exhibits no discharge. Left eye exhibits no discharge. No scleral icterus. Neck: Normal range of motion. Neck supple. Cardiovascular: Normal rate, regular rhythm, normal heart sounds and intact distal pulses. Exam reveals no gallop and no friction rub. No murmur heard. Pulmonary/Chest: Effort normal and breath sounds normal. No respiratory distress. He has no wheezes. He has no rales. Abdominal: Soft. Bowel sounds are normal. He exhibits no distension. There is no tenderness. Musculoskeletal: He exhibits no edema or tenderness. Neurological: He is alert. He exhibits normal muscle tone. Coordination normal.  
Skin: Skin is warm. No rash noted. He is not diaphoretic. No erythema. No pallor. Psychiatric: He has a normal mood and affect. His behavior is normal. Judgment and thought content normal.  
 
 
Assessment and Plan: ICD-10-CM ICD-9-CM 1. Essential hypertension I10 401.9 amLODIPine (NORVASC) 5 mg tablet  
   propranolol (INDERAL) 40 mg tablet  
   propranolol (INDERAL) 80 mg tablet 2. Encounter for immunization Z23 V03.89 INFLUENZA VACCINE INACTIVATED (IIV), SUBUNIT, ADJUVANTED, IM  
3. Gastroesophageal reflux disease, esophagitis presence not specified K21.9 530.81   
4. Screening for colon cancer Z12.11 V76.51 1. Refills reviewed at visit. 2.  Vaccine reviewed at visit. 
 
3,4. Prior referral reviewed--meeting with coordinator today after visit. Reviewed needs to see for GERD/PUD even if not considering colonoscopy. Reviewed FIT and colonoscopy--he thinks still interested in colonoscopy at this time. Follow-up Disposition: 
Return in about 3 months (around 12/21/2018) for Blood Pressure follow-up, referral follow-up--3-4mo. lab results and schedule of future lab studies reviewed with patient 
reviewed diet, exercise and weight control 
reviewed medications and side effects in detail For additional documentation of information and/or recommendations discussed this visit, please see notes in instructions. Plan and evaluation (above) reviewed with pt at visit Patient voiced understanding of plan and provided with time to ask/review questions. After Visit Summary (AVS) provided to pt after visit with additional instructions as needed/reviewed.  
 
 
Greater than 50% of the time in this 25min visit was spent by me in face-to-face counseling and coordination of care--reviewing GI eval and follow-up, questions/rationale for colonoscopy/colon cancer screening, screening decision-making with pt, referral (derm) follow-up and psoriasis mgt.

## 2018-09-21 NOTE — PROGRESS NOTES
RM 16 Patient not fasting at this time. Chief Complaint Patient presents with  Blood Pressure Check  
  followup  Referral Follow Up 1. Have you been to the ER, urgent care clinic since your last visit? Hospitalized since your last visit? No 
 
2. Have you seen or consulted any other health care providers outside of the [address] [place] [address] since your last visit? Include any pap smears or colon screening. No 
 
Health Maintenance Due Topic Date Due  
 Hepatitis C Screening  1950  DTaP/Tdap/Td series (1 - Tdap) 05/17/1969  
 FOBT Q 1 YEAR AGE 50-75  05/17/1998  ZOSTER VACCINE AGE 60>  03/17/2008  GLAUCOMA SCREENING Q2Y  05/17/2013  Pneumococcal 65+ Low/Medium Risk (1 of 2 - PCV13) 05/17/2013  Influenza Age 5 to Adult  08/01/2018 PHQ over the last two weeks 9/21/2018 Little interest or pleasure in doing things Not at all Feeling down, depressed, irritable, or hopeless Not at all Total Score PHQ 2 0 Fall Risk Assessment, last 12 mths 9/21/2018 Able to walk? Yes Fall in past 12 months? Yes Fall with injury? Yes  
Number of falls in past 12 months 1 Fall Risk Score 2 Immunization administered to left deltoid 9/21/2018 by Kaci Garcia LPN with patient's consent. Patient tolerated procedure well. No reactions noted.

## 2018-11-15 DIAGNOSIS — K21.9 GASTROESOPHAGEAL REFLUX DISEASE WITHOUT ESOPHAGITIS: ICD-10-CM

## 2018-11-15 DIAGNOSIS — K27.9 PUD (PEPTIC ULCER DISEASE): ICD-10-CM

## 2018-11-19 RX ORDER — PANTOPRAZOLE SODIUM 40 MG/1
TABLET, DELAYED RELEASE ORAL
Qty: 30 TAB | Refills: 2 | Status: SHIPPED | OUTPATIENT
Start: 2018-11-19 | End: 2019-02-17 | Stop reason: SDUPTHER

## 2018-11-20 NOTE — TELEPHONE ENCOUNTER
Future Appointments   Date Time Provider Floridalma Bolton   12/21/2018  1:30 PM MD NEIDA Jaimes SCHED     Refill request(s) approved--pantoprazole.

## 2019-02-17 DIAGNOSIS — K21.9 GASTROESOPHAGEAL REFLUX DISEASE WITHOUT ESOPHAGITIS: ICD-10-CM

## 2019-02-17 DIAGNOSIS — K27.9 PUD (PEPTIC ULCER DISEASE): ICD-10-CM

## 2019-02-19 NOTE — TELEPHONE ENCOUNTER
----- Message from Abraham Hersey sent at 2/19/2019  9:47 AM EST -----  Regarding: Dr. Queen James  Pt is requesting a call back from Dr. Oma Plata or nurse in reference to medication \"pantoprazole 40mg\". Want to know status. Send to [address] [phone]. Pt best contact [phone].

## 2019-02-20 RX ORDER — PANTOPRAZOLE SODIUM 40 MG/1
TABLET, DELAYED RELEASE ORAL
Qty: 30 TAB | Refills: 2 | Status: SHIPPED | OUTPATIENT
Start: 2019-02-20 | End: 2019-05-20 | Stop reason: SDUPTHER

## 2019-02-20 NOTE — TELEPHONE ENCOUNTER
Refill request(s) approved--pantoprazole.     Future Appointments   Date Time Provider Floridalma Che   2/28/2019  3:15 PM Alisha Kwon MD [address] [facility]

## 2019-02-28 ENCOUNTER — OFFICE VISIT (OUTPATIENT)
Dept: INTERNAL MEDICINE CLINIC | Age: 69
End: 2019-02-28

## 2019-02-28 VITALS
SYSTOLIC BLOOD PRESSURE: 142 MMHG | RESPIRATION RATE: 17 BRPM | DIASTOLIC BLOOD PRESSURE: 80 MMHG | OXYGEN SATURATION: 99 % | HEART RATE: 75 BPM | WEIGHT: 167.6 LBS | TEMPERATURE: 97.4 F | HEIGHT: 68 IN | BODY MASS INDEX: 25.4 KG/M2

## 2019-02-28 DIAGNOSIS — I10 ESSENTIAL HYPERTENSION: ICD-10-CM

## 2019-02-28 DIAGNOSIS — L40.9 PSORIASIS: Primary | ICD-10-CM

## 2019-02-28 DIAGNOSIS — T14.8XXA BRUISING: ICD-10-CM

## 2019-02-28 RX ORDER — PROPRANOLOL HYDROCHLORIDE 80 MG/1
80 TABLET ORAL 2 TIMES DAILY
Qty: 60 TAB | Refills: 5 | Status: SHIPPED | OUTPATIENT
Start: 2019-02-28 | End: 2019-07-09 | Stop reason: SDUPTHER

## 2019-02-28 RX ORDER — AMLODIPINE BESYLATE 5 MG/1
TABLET ORAL
Qty: 30 TAB | Refills: 5 | Status: SHIPPED | OUTPATIENT
Start: 2019-02-28 | End: 2019-07-09 | Stop reason: SDUPTHER

## 2019-02-28 RX ORDER — PROPRANOLOL HYDROCHLORIDE 40 MG/1
40 TABLET ORAL 2 TIMES DAILY
Qty: 60 TAB | Refills: 5 | Status: SHIPPED | OUTPATIENT
Start: 2019-02-28 | End: 2019-07-09 | Stop reason: SDUPTHER

## 2019-02-28 RX ORDER — HALOBETASOL PROPIONATE 0.5 MG/G
OINTMENT TOPICAL
Qty: 60 G | Refills: 1 | Status: SHIPPED | OUTPATIENT
Start: 2019-02-28 | End: 2019-06-17 | Stop reason: SDUPTHER

## 2019-02-28 NOTE — PROGRESS NOTES
RM     No chief complaint on file. 1. Have you been to the ER, urgent care clinic since your last visit? Hospitalized since your last visit? No    2. Have you seen or consulted any other health care providers outside of the [address] [place] [address] since your last visit? Include any pap smears or colon screening. No    Health Maintenance Due   Topic Date Due    Hepatitis C Screening  1950    DTaP/Tdap/Td series (1 - Tdap) 05/17/1969    Shingrix Vaccine Age 50> (1 of 2) 05/17/1998    FOBT Q 1 YEAR AGE 50-75  05/17/1998    GLAUCOMA SCREENING Q2Y  05/17/2013    Pneumococcal 65+ Low/Medium Risk (1 of 2 - PCV13) 05/17/2013     Abuse Screening Questionnaire 2/6/2018   Do you ever feel afraid of your partner? N   Are you in a relationship with someone who physically or mentally threatens you? N   Is it safe for you to go home?  Y     3 most recent PHQ Screens 9/21/2018   Little interest or pleasure in doing things Not at all   Feeling down, depressed, irritable, or hopeless Not at all   Total Score PHQ 2 0     Learning Assessment 2/6/2018   PRIMARY LEARNER Patient   PRIMARY LANGUAGE ENGLISH   LEARNER PREFERENCE PRIMARY READING   ANSWERED BY patient   RELATIONSHIP SELF

## 2019-02-28 NOTE — PROGRESS NOTES
History of Present Illness:   Shabbir Valencia is a 77 y.o. male here for evaluation:    Chief Complaint   Patient presents with    Hypertension     follow up    Medication Refill     Here for follow-up. He ntoes has not had follow-up with dermatology for psoriasis. Reviewed topical steroid refill. Notes this is all that helps and all he is using. He notes seen by GI and no changes made. He does not recall discussion of EGD. He is not sure but thinks he talked to provider about ulcer history. He decided not to have colonoscopy for colon cancer screening. He notes he decided not to do colonoscopy with GI prior to their visit and confirmed with GI at visit. He is not interested in other non-colonoscopy colon cancer screening. He notes bruising forearms bilat. Notes in areas trauma. Reviewed care--he is using Vaseline. Notes no oterh locations bruising--none noted on legs. Notes no gum or GI bleeding. Reviewed eval and mgt. Reviewed not using steroid on bruised areas. Reviewed topical zinc oxide for bruising if needed. Requested records from GI after visit. Prior to Admission medications    Medication Sig Start Date End Date Taking? Authorizing Provider   halobetasol (ULTRAVATE) 0.05 % ointment APPLY TO AFFECTED AREA TWICE A DAY AS NEEDED--AVOID FACE, GROIN, ARMPITS. 2/28/19  Yes Noe Saavedra MD   amLODIPine (NORVASC) 5 mg tablet TAKE 1 TABLET BY MOUTH EVERY DAY 2/28/19  Yes Noe Saavedra MD   propranolol (INDERAL) 40 mg tablet Take 1 Tab by mouth two (2) times a day. Take with 80mg tab, for 120mg two times daily. 2/28/19  Yes Noe Saavedra MD   propranolol (INDERAL) 80 mg tablet Take 1 Tab by mouth two (2) times a day. Take with 40mg tab, for 120mg two times daily.  2/28/19  Yes Noe Saavedra MD   pantoprazole (PROTONIX) 40 mg tablet TAKE 1 TABLET BY MOUTH EVERY DAY 2/20/19  Yes Noe Saavedra MD   acetaminophen/diphenhydramine (TYLENOL PM PO) Take  by mouth. Yes Provider, Historical   Azelastine (ASTEPRO) 0.15 % (205.5 mcg) nasal spray 1-2 Sprays by Both Nostrils route two (2) times daily as needed. For allergies/congestion. 9/19/18  Yes Rei Will MD   olopatadine (PATANASE) 0.6 % spry 2 Squirts by Both Nostrils route two (2) times daily as needed. Use instead of Azelastine nasal spray--not covered. 6/16/17  Yes Rei Will MD        ROS    Vitals:    02/28/19 1454   BP: 142/80   Pulse: 75   Resp: 17   Temp: 97.4 °F (36.3 °C)   TempSrc: Oral   SpO2: 99%   Weight: 167 lb 9.6 oz (76 kg)   Height: 5' 8\" (1.727 m)   PainSc:   0 - No pain      Body mass index is 25.48 kg/m². Physical Exam:     Physical Exam   Constitutional: He appears well-developed and well-nourished. No distress. HENT:   Head: Normocephalic and atraumatic. Eyes: Conjunctivae are normal. Right eye exhibits no discharge. Left eye exhibits no discharge. No scleral icterus. Neck: Normal range of motion. Neck supple. Cardiovascular: Normal rate, regular rhythm, normal heart sounds and intact distal pulses. Exam reveals no gallop and no friction rub. No murmur heard. Pulmonary/Chest: Effort normal and breath sounds normal. No respiratory distress. He has no wheezes. He has no rales. Abdominal: Soft. Bowel sounds are normal. He exhibits no distension. There is no tenderness. Musculoskeletal: He exhibits no edema or tenderness. Neurological: He is alert. He exhibits normal muscle tone. Coordination normal.   Skin: Skin is warm. He is not diaphoretic. No erythema. No pallor. Notes and has multiple bruises bilat dorsal forearms. Notes no other bruising/locations. Psychiatric: He has a normal mood and affect. His behavior is normal. Judgment and thought content normal.       Assessment and Plan:       ICD-10-CM ICD-9-CM    1.  Psoriasis L40.9 696.1 halobetasol (ULTRAVATE) 0.05 % ointment      REFERRAL TO DERMATOLOGY   2. Essential hypertension I10 401.9 amLODIPine (NORVASC) 5 mg tablet      propranolol (INDERAL) 40 mg tablet      propranolol (INDERAL) 80 mg tablet   3. Bruising T14. 8XXA 924.9        1. Refill reviewed. Plan follow-up with [address] reviewed. Records reviewed in [address]. 2.  Refills reviewed. 3.  Monitor at follow-up. Eval labs sooner if in locations other than dorsal forearms. Follow-up Disposition:  Return in about 3 months (around 5/28/2019) for fasting labs, Blood Pressure follow-up--3-4mo. lab results and schedule of future lab studies reviewed with patient  reviewed medications and side effects in detail    For additional documentation of information and/or recommendations discussed this visit, please see notes in instructions. Plan and evaluation (above) reviewed with pt at visit  Patient voiced understanding of plan and provided with time to ask/review questions. After Visit Summary (AVS) provided to pt after visit with additional instructions as needed/reviewed.

## 2019-02-28 NOTE — PATIENT INSTRUCTIONS
1.  Please call dermatology at [facility] to set up follow-up. Dr. Dariana Sharma Dermatology  [facility]  Department of Dermatology  [facility], [address] [place] [address]  [phone]  Fax: [phone]      2. Please follow the following instructions to process/authorize your referral, if needed:    Referrals processing  Please verify with your insurance IF you need referral authorization submitted. For insurance plans which require this, please follow the following steps. FAILURE TO DO SO MAY RESULT IN INABILITY TO SEE THE SPECIALIST YOU HAVE BEEN REFERRED TO (once you are scheduled to see them). 1. Call and schedule appointment with specialist  2. Call our clinic and leave message with provider name, and date of appointment  3. We will then submit the referral to your insurance. This process takes 2-5 business days. If you have questions about scheduling or authorizing referral, you can review with our referral coordinators Shital Alberts. or Pierre Tellez) at the . You can review with them today if available/if you have time, or you can call to review with them once you have made your referral/appointment. If you are not sure if you need referral authorizations, please review with the referral coordinators, either prior to or after you have made the appointment, as reviewed.

## 2019-02-28 NOTE — PROGRESS NOTES
Exam room Memo Jarvis is a 77 y.o. male    Chief Complaint   Patient presents with    Hypertension     follow up    Medication Refill     1. Have you been to the ER, urgent care clinic since your last visit? Hospitalized since your last visit? No    2. Have you seen or consulted any other health care providers outside of the [address] [place] [address] since your last visit? Include any pap smears or colon screening.  No     Health Maintenance Due   Topic Date Due    Hepatitis C Screening  1950    DTaP/Tdap/Td series (1 - Tdap) 05/17/1969    Shingrix Vaccine Age 50> (1 of 2) 05/17/1998    FOBT Q 1 YEAR AGE 50-75  05/17/1998    GLAUCOMA SCREENING Q2Y  05/17/2013    Pneumococcal 65+ Low/Medium Risk (1 of 2 - PCV13) 05/17/2013

## 2019-03-08 ENCOUNTER — TELEPHONE (OUTPATIENT)
Dept: INTERNAL MEDICINE CLINIC | Age: 69
End: 2019-03-08

## 2019-03-08 NOTE — TELEPHONE ENCOUNTER
Records request faxed to Dr. Xi Gutierrez office      ----- Message from Nelle Bernheim, MD sent at 2/28/2019  4:16 PM EST -----  Regarding: Please request GI records  He notes saw Dr. Satinder Rausch as referred prior. Please obtain notes. Thanks.     Ceres Daniele

## 2019-05-20 DIAGNOSIS — K21.9 GASTROESOPHAGEAL REFLUX DISEASE WITHOUT ESOPHAGITIS: ICD-10-CM

## 2019-05-20 DIAGNOSIS — K27.9 PUD (PEPTIC ULCER DISEASE): ICD-10-CM

## 2019-05-23 RX ORDER — PANTOPRAZOLE SODIUM 40 MG/1
TABLET, DELAYED RELEASE ORAL
Qty: 30 TAB | Refills: 5 | Status: SHIPPED | OUTPATIENT
Start: 2019-05-23 | End: 2019-11-04 | Stop reason: SDUPTHER

## 2019-05-23 NOTE — TELEPHONE ENCOUNTER
LOV: 2/28/19  No future appt scheduled    [facility] calling to check status of refill erquest

## 2019-06-17 DIAGNOSIS — J30.89 ENVIRONMENTAL AND SEASONAL ALLERGIES: ICD-10-CM

## 2019-06-17 DIAGNOSIS — L40.9 PSORIASIS: ICD-10-CM

## 2019-06-18 RX ORDER — HALOBETASOL PROPIONATE 0.5 MG/G
OINTMENT TOPICAL
Qty: 50 G | Refills: 1 | Status: SHIPPED | OUTPATIENT
Start: 2019-06-18 | End: 2019-09-06 | Stop reason: SDUPTHER

## 2019-06-18 RX ORDER — AZELASTINE HCL 205.5 UG/1
SPRAY NASAL
Qty: 30 ML | Refills: 5 | Status: SHIPPED | OUTPATIENT
Start: 2019-06-18 | End: 2019-07-10 | Stop reason: SDUPTHER

## 2019-07-09 ENCOUNTER — OFFICE VISIT (OUTPATIENT)
Dept: INTERNAL MEDICINE CLINIC | Age: 69
End: 2019-07-09

## 2019-07-09 VITALS
TEMPERATURE: 97.6 F | WEIGHT: 171.13 LBS | OXYGEN SATURATION: 98 % | SYSTOLIC BLOOD PRESSURE: 146 MMHG | DIASTOLIC BLOOD PRESSURE: 77 MMHG | RESPIRATION RATE: 16 BRPM | HEIGHT: 68 IN | HEART RATE: 61 BPM | BODY MASS INDEX: 25.93 KG/M2

## 2019-07-09 DIAGNOSIS — J30.89 ENVIRONMENTAL AND SEASONAL ALLERGIES: ICD-10-CM

## 2019-07-09 DIAGNOSIS — I10 ESSENTIAL HYPERTENSION: ICD-10-CM

## 2019-07-09 DIAGNOSIS — I73.9 PAD (PERIPHERAL ARTERY DISEASE) (HCC): Primary | ICD-10-CM

## 2019-07-09 DIAGNOSIS — L40.9 PSORIASIS: ICD-10-CM

## 2019-07-09 DIAGNOSIS — R20.0 RIGHT LEG NUMBNESS: ICD-10-CM

## 2019-07-09 DIAGNOSIS — Z00.00 WELL ADULT HEALTH CHECK: ICD-10-CM

## 2019-07-09 RX ORDER — PROPRANOLOL HYDROCHLORIDE 80 MG/1
80 TABLET ORAL 2 TIMES DAILY
Qty: 60 TAB | Refills: 5 | Status: SHIPPED | OUTPATIENT
Start: 2019-07-09 | End: 2020-06-01 | Stop reason: SDUPTHER

## 2019-07-09 RX ORDER — PROPRANOLOL HYDROCHLORIDE 40 MG/1
40 TABLET ORAL 2 TIMES DAILY
Qty: 60 TAB | Refills: 5 | Status: SHIPPED | OUTPATIENT
Start: 2019-07-09 | End: 2020-06-01 | Stop reason: SDUPTHER

## 2019-07-09 RX ORDER — AMLODIPINE BESYLATE 5 MG/1
TABLET ORAL
Qty: 30 TAB | Refills: 5 | Status: SHIPPED | OUTPATIENT
Start: 2019-07-09 | End: 2020-06-01 | Stop reason: SDUPTHER

## 2019-07-09 NOTE — PROGRESS NOTES
History of Present Illness:   Quinn Anthony is a 68 y.o. male here for evaluation:    Chief Complaint   Patient presents with    Blood Pressure Check     follow up    Medication Evaluation     Here to follow-up above. LOV Feb 2019. Notes:  Patient reports 10/10 pain to right leg and hip when weight bearing. Patient state he needs to have surgery done to remove clogged artery.      Eye exam not up to date per patient. He notes pain with movement--he notes unable to walk 50yrd without pain. He notes pain with walking to mailbox--not able to assess distance. No problems with BP meds. Reviewed continue same doses pending vascular eval.      Reviewed last vascular eval July 2017 and referral again to re-eval since worsening pain. Bayron Luong No pain at rest.      Reviewed refills of BP meds today at visit. He notes he has not seen or scheduled with [facility] for appt. Reviewed contact info for derm again and put in instructions for pt. He notes would prefer to see derm at [facility]--not in community. Nursing screenings reviewed by provider at visit. Prior to Admission medications    Medication Sig Start Date End Date Taking? Authorizing Provider   halobetasol (ULTRAVATE) 0.05 % ointment APPLY TO AFFECTED AREA TWICE A DAY AS NEEDED--AVOID FACE, GROIN, ARMPITS 6/18/19  Yes Michael Dwyer MD   azelastine (ASTEPRO) 0.15 % (205.5 mcg) INHALE 1-2 SPRAYS INTO EACH NOSTRIL TWO TIMES DAILY AS NEEDED FOR ALLERGIES 6/18/19  Yes Michael Dwyer MD   pantoprazole (PROTONIX) 40 mg tablet TAKE 1 TABLET BY MOUTH EVERY DAY 5/23/19  Yes Michael Dwyer MD   amLODIPine (NORVASC) 5 mg tablet TAKE 1 TABLET BY MOUTH EVERY DAY 2/28/19  Yes Michael Dwyer MD   propranolol (INDERAL) 40 mg tablet Take 1 Tab by mouth two (2) times a day. Take with 80mg tab, for 120mg two times daily.  2/28/19  Yes Michael Dwyer MD   propranolol (INDERAL) 80 mg tablet Take 1 Tab by mouth two (2) times a day. Take with 40mg tab, for 120mg two times daily. 2/28/19  Yes Cassi Mejia MD   acetaminophen/diphenhydramine (TYLENOL PM PO) Take  by mouth as needed. Yes Provider, Historical   olopatadine (PATANASE) 0.6 % spry 2 Squirts by Both Nostrils route two (2) times daily as needed. Use instead of Azelastine nasal spray--not covered. 6/16/17  Yes Cassi Mejia MD        ROS    Vitals:    07/09/19 0900   BP: 147/83   Pulse: 69   Resp: 16   Temp: 97.6 °F (36.4 °C)   TempSrc: Oral   SpO2: 98%   Weight: 171 lb 2 oz (77.6 kg)   Height: 5' 8\" (1.727 m)   PainSc:   0 - No pain      Body mass index is 26.02 kg/m². Physical Exam:     Physical Exam   Constitutional: He appears well-developed and well-nourished. No distress. HENT:   Head: Normocephalic and atraumatic. Eyes: Conjunctivae are normal. Right eye exhibits no discharge. Left eye exhibits no discharge. No scleral icterus. Cardiovascular: Normal rate, regular rhythm, normal heart sounds and intact distal pulses. Exam reveals no gallop and no friction rub. No murmur heard. Pulmonary/Chest: Effort normal and breath sounds normal. No respiratory distress. He has no wheezes. He has no rales. Abdominal: Soft. Bowel sounds are normal. He exhibits no distension. There is no tenderness. Musculoskeletal: He exhibits no edema or tenderness. RLE warm, but difficult to palpate PT or DP pulses. He notes numbness LE--this is new since vascular eval 2yrs ago. Reviewed eval with vascular, then neuro based on vascular eval.   Neurological: He is alert. He exhibits normal muscle tone. Coordination normal.   Skin: Skin is warm. No rash noted. He is not diaphoretic. No erythema. No pallor. Psychiatric: He has a normal mood and affect. His behavior is normal. Judgment and thought content normal.       Assessment and Plan:       ICD-10-CM ICD-9-CM    1.  PAD (peripheral artery disease) ([facility]) I73.9 443.9 REFERRAL TO VASCULAR SURGERY      [facility] WITH AUTOMATED DIFF      METABOLIC PANEL, COMPREHENSIVE   2. Essential hypertension I10 401.9 CBC WITH AUTOMATED DIFF      METABOLIC PANEL, COMPREHENSIVE      HEMOGLOBIN A1C WITH EAG      amLODIPine (NORVASC) 5 mg tablet      propranolol (INDERAL) 40 mg tablet      propranolol (INDERAL) 80 mg tablet   3. Well adult health check Z00.00 V70.0 CBC WITH AUTOMATED DIFF      METABOLIC PANEL, COMPREHENSIVE      HEMOGLOBIN A1C WITH EAG      PSA W/ REFLX FREE PSA   4. Right leg numbness R20.0 782.0    5. Psoriasis L40.9 696.1        1. Re-evaluation with vascular surgery reviewed due to worsening pain RLE and new numbness RLE.    2,3:  Non-fasting labs reviewed/done today. Maintain current BP meds pending vascular evaluation. 4.  Pt notes new symptoms since eval with vascular 2017. Eval there, and if not related to PAD, plan neuro eval reviewed. 5.  Referral info to schedule follow-up with dermatology reviewed--copied to instructions again today from prior. He is fine with scheduling with prior [facility] provider. Follow-up and Dispositions    · Return in about 3 months (around 10/9/2019) for Blood Pressure follow-up, referral follow-up.       lab results and schedule of future lab studies reviewed with patient  reviewed medications and side effects in detail    For additional documentation of information and/or recommendations discussed this visit, please see notes in instructions. Plan and evaluation (above) reviewed with pt at visit  Patient voiced understanding of plan and provided with time to ask/review questions. After Visit Summary (AVS) provided to pt after visit with additional instructions as needed/reviewed.

## 2019-07-09 NOTE — PATIENT INSTRUCTIONS
1.  Please call dermatology at [facility] to set up follow-up. Dr. Lashae Gardner [facility] Department of Dermatology [facility], [address] [facility] 
[phone] Fax: [phone] 2. Please follow the following instructions to process/authorize your referral, if needed: 
 
Referrals processing Please verify with your insurance IF you need referral authorization submitted. For insurance plans which require this, please follow the following steps. FAILURE TO DO SO MAY RESULT IN INABILITY TO SEE THE SPECIALIST YOU HAVE BEEN REFERRED TO (once you are scheduled to see them). 1. Call and schedule appointment with specialist 
2. Call our clinic and leave message with provider name, and date of appointment 3. We will then submit the referral to your insurance. This process takes 2-5 business days. If you have questions about scheduling or authorizing referral, you can review with our referral coordinator Katelyn Pride. You can review with her today if available/if you have time, or you can call to review once you have made your referral/appointment. If you are not sure if you need referral authorizations, please review with the referral coordinator(s), either prior to or after you have made the appointment, as reviewed.

## 2019-07-09 NOTE — PROGRESS NOTES
RM 16    Patient reports 10/10 pain to right leg and hip when weight bearing. Patient state he needs to have surgery done to remove clogged artery. Eye exam not up to date per patient. Chief Complaint   Patient presents with    Blood Pressure Check     follow up    Medication Evaluation     1. Have you been to the ER, urgent care clinic since your last visit? Hospitalized since your last visit? Yes Reason for visit: [address], 2 weeks ago, weak    2. Have you seen or consulted any other health care providers outside of the [address] [place] [address] since your last visit? Include any pap smears or colon screening. No    Health Maintenance Due   Topic Date Due    Hepatitis C Screening  1950    DTaP/Tdap/Td series (1 - Tdap) 05/17/1969    Shingrix Vaccine Age 50> (1 of 2) 05/17/1998    FOBT Q 1 YEAR AGE 50-75  05/17/1998    GLAUCOMA SCREENING Q2Y  05/17/2013    Pneumococcal 65+ years (1 of 2 - PCV13) 05/17/2013       Abuse Screening Questionnaire 7/9/2019   Do you ever feel afraid of your partner? N   Are you in a relationship with someone who physically or mentally threatens you? N   Is it safe for you to go home?  Y     3 most recent PHQ Screens 7/9/2019   Little interest or pleasure in doing things Not at all   Feeling down, depressed, irritable, or hopeless Nearly every day   Total Score PHQ 2 3   Trouble falling or staying asleep, or sleeping too much Nearly every day   Feeling tired or having little energy Not at all   Poor appetite, weight loss, or overeating Not at all   Feeling bad about yourself - or that you are a failure or have let yourself or your family down Not at all   Trouble concentrating on things such as school, work, reading, or watching TV Not at all   Moving or speaking so slowly that other people could have noticed; or the opposite being so fidgety that others notice Not at all   Thoughts of being better off dead, or hurting yourself in some way Not at all   PHQ 9 Score 6   How difficult have these problems made it for you to do your work, take care of your home and get along with others Not difficult at all     Learning Assessment 7/9/2019   PRIMARY LEARNER Patient   BARRIERS PRIMARY LEARNER NONE   PRIMARY LANGUAGE ENGLISH   LEARNER PREFERENCE PRIMARY DEMONSTRATION     LISTENING     READING     VIDEOS   ANSWERED BY patient   RELATIONSHIP SELF

## 2019-07-10 LAB
ALBUMIN SERPL-MCNC: 4.2 G/DL (ref 3.5–4.8)
ALBUMIN/GLOB SERPL: 1.8 {RATIO} (ref 1.2–2.2)
ALP SERPL-CCNC: 78 IU/L (ref 39–117)
ALT SERPL-CCNC: 11 IU/L (ref 0–44)
AST SERPL-CCNC: 13 IU/L (ref 0–40)
BASOPHILS # BLD AUTO: 0.1 X10E3/UL (ref 0–0.2)
BASOPHILS NFR BLD AUTO: 1 %
BILIRUB SERPL-MCNC: <0.2 MG/DL (ref 0–1.2)
BUN SERPL-MCNC: 12 MG/DL (ref 8–27)
BUN/CREAT SERPL: 13 (ref 10–24)
CALCIUM SERPL-MCNC: 9.5 MG/DL (ref 8.6–10.2)
CHLORIDE SERPL-SCNC: 103 MMOL/L (ref 96–106)
CO2 SERPL-SCNC: 22 MMOL/L (ref 20–29)
CREAT SERPL-MCNC: 0.92 MG/DL (ref 0.76–1.27)
EOSINOPHIL # BLD AUTO: 0.3 X10E3/UL (ref 0–0.4)
EOSINOPHIL NFR BLD AUTO: 4 %
ERYTHROCYTE [DISTWIDTH] IN BLOOD BY AUTOMATED COUNT: 13.4 % (ref 12.3–15.4)
EST. AVERAGE GLUCOSE BLD GHB EST-MCNC: 114 MG/DL
GLOBULIN SER CALC-MCNC: 2.3 G/DL (ref 1.5–4.5)
GLUCOSE SERPL-MCNC: 80 MG/DL (ref 65–99)
HBA1C MFR BLD: 5.6 % (ref 4.8–5.6)
HCT VFR BLD AUTO: 41.6 % (ref 37.5–51)
HGB BLD-MCNC: 13.5 G/DL (ref 13–17.7)
IMM GRANULOCYTES # BLD AUTO: 0 X10E3/UL (ref 0–0.1)
IMM GRANULOCYTES NFR BLD AUTO: 0 %
LYMPHOCYTES # BLD AUTO: 1.7 X10E3/UL (ref 0.7–3.1)
LYMPHOCYTES NFR BLD AUTO: 25 %
MCH RBC QN AUTO: 31.8 PG (ref 26.6–33)
MCHC RBC AUTO-ENTMCNC: 32.5 G/DL (ref 31.5–35.7)
MCV RBC AUTO: 98 FL (ref 79–97)
MONOCYTES # BLD AUTO: 0.6 X10E3/UL (ref 0.1–0.9)
MONOCYTES NFR BLD AUTO: 8 %
NEUTROPHILS # BLD AUTO: 4.3 X10E3/UL (ref 1.4–7)
NEUTROPHILS NFR BLD AUTO: 62 %
PLATELET # BLD AUTO: 329 X10E3/UL (ref 150–450)
POTASSIUM SERPL-SCNC: 4.6 MMOL/L (ref 3.5–5.2)
PROT SERPL-MCNC: 6.5 G/DL (ref 6–8.5)
PSA SERPL-MCNC: 0.8 NG/ML (ref 0–4)
RBC # BLD AUTO: 4.24 X10E6/UL (ref 4.14–5.8)
REFLEX CRITERIA: NORMAL
SODIUM SERPL-SCNC: 140 MMOL/L (ref 134–144)
WBC # BLD AUTO: 6.9 X10E3/UL (ref 3.4–10.8)

## 2019-07-10 RX ORDER — AZELASTINE HCL 205.5 UG/1
SPRAY NASAL
Qty: 1 BOTTLE | Refills: 5 | Status: SHIPPED | OUTPATIENT
Start: 2019-07-10 | End: 2019-09-12 | Stop reason: SDUPTHER

## 2019-09-06 DIAGNOSIS — L40.9 PSORIASIS: ICD-10-CM

## 2019-09-06 DIAGNOSIS — J30.89 ENVIRONMENTAL AND SEASONAL ALLERGIES: ICD-10-CM

## 2019-09-12 RX ORDER — HALOBETASOL PROPIONATE 0.5 MG/G
OINTMENT TOPICAL
Qty: 50 G | Refills: 1 | Status: SHIPPED | OUTPATIENT
Start: 2019-09-12 | End: 2019-11-04 | Stop reason: SDUPTHER

## 2019-09-12 RX ORDER — AZELASTINE HCL 205.5 UG/1
SPRAY NASAL
Qty: 30 ML | Refills: 5 | Status: SHIPPED | OUTPATIENT
Start: 2019-09-12 | End: 2020-01-20

## 2019-09-13 NOTE — TELEPHONE ENCOUNTER
Refill request(s) approved--asteline nasal, Ultravate (halobetasol) topical steroid. Last topical steroid refill June 2019. Review at follow-up.

## 2019-11-04 DIAGNOSIS — L40.9 PSORIASIS: ICD-10-CM

## 2019-11-04 DIAGNOSIS — J30.89 ENVIRONMENTAL AND SEASONAL ALLERGIES: ICD-10-CM

## 2019-11-04 DIAGNOSIS — K27.9 PUD (PEPTIC ULCER DISEASE): ICD-10-CM

## 2019-11-04 DIAGNOSIS — K21.9 GASTROESOPHAGEAL REFLUX DISEASE WITHOUT ESOPHAGITIS: ICD-10-CM

## 2019-11-05 RX ORDER — PANTOPRAZOLE SODIUM 40 MG/1
TABLET, DELAYED RELEASE ORAL
Qty: 30 TAB | Refills: 5 | Status: SHIPPED | OUTPATIENT
Start: 2019-11-05 | End: 2020-04-27 | Stop reason: SDUPTHER

## 2019-11-05 RX ORDER — AZELASTINE HCL 205.5 UG/1
SPRAY NASAL
Qty: 1 BOTTLE | Refills: 5 | Status: SHIPPED | OUTPATIENT
Start: 2019-11-05 | End: 2020-01-20

## 2019-11-05 RX ORDER — HALOBETASOL PROPIONATE 0.5 MG/G
OINTMENT TOPICAL
Qty: 50 G | Refills: 1 | Status: SHIPPED | OUTPATIENT
Start: 2019-11-05 | End: 2020-01-10

## 2019-12-09 ENCOUNTER — HOSPITAL ENCOUNTER (OUTPATIENT)
Dept: CT IMAGING | Age: 69
Discharge: HOME | End: 2019-12-09
Attending: SURGERY
Payer: MEDICAID

## 2019-12-09 DIAGNOSIS — I77.9 AORTO-ILIAC DISEASE (HCC): ICD-10-CM

## 2019-12-09 LAB — CREAT BLD-MCNC: 1 MG/DL (ref 0.6–1.3)

## 2019-12-09 PROCEDURE — 74011636320 HC RX REV CODE- 636/320: Performed by: SURGERY

## 2019-12-09 PROCEDURE — 75635 CT ANGIO ABDOMINAL ARTERIES: CPT

## 2019-12-09 PROCEDURE — 82565 ASSAY OF CREATININE: CPT

## 2019-12-09 RX ORDER — SODIUM CHLORIDE 0.9 % (FLUSH) 0.9 %
10 SYRINGE (ML) INJECTION
Status: COMPLETED | OUTPATIENT
Start: 2019-12-09 | End: 2019-12-09

## 2019-12-09 RX ADMIN — Medication 10 ML: at 12:18

## 2019-12-09 RX ADMIN — IOPAMIDOL 100 ML: 755 INJECTION, SOLUTION INTRAVENOUS at 12:18

## 2019-12-30 DIAGNOSIS — L40.9 PSORIASIS: ICD-10-CM

## 2020-01-10 RX ORDER — HALOBETASOL PROPIONATE 0.5 MG/G
OINTMENT TOPICAL
Qty: 50 G | Refills: 1 | Status: SHIPPED | OUTPATIENT
Start: 2020-01-10 | End: 2020-03-12

## 2020-01-11 NOTE — TELEPHONE ENCOUNTER
Refill request(s) approved--halobetasol ointment. Last refill 11-5-19 for #50gm with 1 refill.     Future Appointments   Date Time Provider Floridalma Bolton   1/20/2020  2:00 PM [facility] PAT ROOM P3 [facility] PAT RI OR PRE AS

## 2020-01-20 ENCOUNTER — HOSPITAL ENCOUNTER (OUTPATIENT)
Dept: PREADMISSION TESTING | Age: 70
Discharge: HOME | End: 2020-01-20
Payer: MEDICAID

## 2020-01-20 ENCOUNTER — HOSPITAL ENCOUNTER (OUTPATIENT)
Dept: GENERAL RADIOLOGY | Age: 70
Discharge: HOME | End: 2020-01-20
Attending: SURGERY
Payer: MEDICAID

## 2020-01-20 VITALS
DIASTOLIC BLOOD PRESSURE: 66 MMHG | HEART RATE: 81 BPM | OXYGEN SATURATION: 96 % | TEMPERATURE: 97.5 F | WEIGHT: 184.75 LBS | SYSTOLIC BLOOD PRESSURE: 148 MMHG | HEIGHT: 69 IN | BODY MASS INDEX: 27.36 KG/M2

## 2020-01-20 LAB
ABO + RH BLD: NORMAL
ALBUMIN SERPL-MCNC: 3.8 G/DL (ref 3.5–5)
ALBUMIN/GLOB SERPL: 1 {RATIO} (ref 1.1–2.2)
ALP SERPL-CCNC: 75 U/L (ref 45–117)
ALT SERPL-CCNC: 24 U/L (ref 12–78)
ANION GAP SERPL CALC-SCNC: 5 MMOL/L (ref 5–15)
APPEARANCE UR: CLEAR
APTT PPP: 24.6 SEC (ref 22.1–32)
AST SERPL-CCNC: 19 U/L (ref 15–37)
BACTERIA URNS QL MICRO: NEGATIVE /HPF
BASOPHILS # BLD: 0.1 K/UL (ref 0–0.1)
BASOPHILS NFR BLD: 1 % (ref 0–1)
BILIRUB SERPL-MCNC: 0.3 MG/DL (ref 0.2–1)
BILIRUB UR QL: NEGATIVE
BLOOD GROUP ANTIBODIES SERPL: NORMAL
BUN SERPL-MCNC: 13 MG/DL (ref 6–20)
BUN/CREAT SERPL: 13 (ref 12–20)
CALCIUM SERPL-MCNC: 8.9 MG/DL (ref 8.5–10.1)
CHLORIDE SERPL-SCNC: 106 MMOL/L (ref 97–108)
CO2 SERPL-SCNC: 24 MMOL/L (ref 21–32)
COLOR UR: NORMAL
CREAT SERPL-MCNC: 0.97 MG/DL (ref 0.7–1.3)
DIFFERENTIAL METHOD BLD: ABNORMAL
EOSINOPHIL # BLD: 0.2 K/UL (ref 0–0.4)
EOSINOPHIL NFR BLD: 3 % (ref 0–7)
EPITH CASTS URNS QL MICRO: NORMAL /LPF
ERYTHROCYTE [DISTWIDTH] IN BLOOD BY AUTOMATED COUNT: 12 % (ref 11.5–14.5)
GLOBULIN SER CALC-MCNC: 3.9 G/DL (ref 2–4)
GLUCOSE SERPL-MCNC: 110 MG/DL (ref 65–100)
GLUCOSE UR STRIP.AUTO-MCNC: NEGATIVE MG/DL
HCT VFR BLD AUTO: 40.9 % (ref 36.6–50.3)
HGB BLD-MCNC: 13.4 G/DL (ref 12.1–17)
HGB UR QL STRIP: NEGATIVE
HYALINE CASTS URNS QL MICRO: NORMAL /LPF (ref 0–5)
IMM GRANULOCYTES # BLD AUTO: 0 K/UL (ref 0–0.04)
IMM GRANULOCYTES NFR BLD AUTO: 0 % (ref 0–0.5)
INR PPP: 0.9 (ref 0.9–1.1)
KETONES UR QL STRIP.AUTO: NEGATIVE MG/DL
LEUKOCYTE ESTERASE UR QL STRIP.AUTO: NEGATIVE
LYMPHOCYTES # BLD: 1.9 K/UL (ref 0.8–3.5)
LYMPHOCYTES NFR BLD: 26 % (ref 12–49)
MCH RBC QN AUTO: 33 PG (ref 26–34)
MCHC RBC AUTO-ENTMCNC: 32.8 G/DL (ref 30–36.5)
MCV RBC AUTO: 100.7 FL (ref 80–99)
MONOCYTES # BLD: 0.6 K/UL (ref 0–1)
MONOCYTES NFR BLD: 8 % (ref 5–13)
NEUTS SEG # BLD: 4.4 K/UL (ref 1.8–8)
NEUTS SEG NFR BLD: 62 % (ref 32–75)
NITRITE UR QL STRIP.AUTO: NEGATIVE
NRBC # BLD: 0 K/UL (ref 0–0.01)
NRBC BLD-RTO: 0 PER 100 WBC
PH UR STRIP: 6 [PH] (ref 5–8)
PLATELET # BLD AUTO: 330 K/UL (ref 150–400)
PMV BLD AUTO: 10 FL (ref 8.9–12.9)
POTASSIUM SERPL-SCNC: 4.1 MMOL/L (ref 3.5–5.1)
PROT SERPL-MCNC: 7.7 G/DL (ref 6.4–8.2)
PROT UR STRIP-MCNC: NEGATIVE MG/DL
PROTHROMBIN TIME: 9.3 SEC (ref 9–11.1)
RBC # BLD AUTO: 4.06 M/UL (ref 4.1–5.7)
RBC #/AREA URNS HPF: NORMAL /HPF (ref 0–5)
SODIUM SERPL-SCNC: 135 MMOL/L (ref 136–145)
SP GR UR REFRACTOMETRY: 1.01 (ref 1–1.03)
SPECIMEN EXP DATE BLD: NORMAL
THERAPEUTIC RANGE,PTTT: NORMAL SECS (ref 58–77)
UA: UC IF INDICATED,UAUC: NORMAL
UROBILINOGEN UR QL STRIP.AUTO: 0.2 EU/DL (ref 0.2–1)
WBC # BLD AUTO: 7.3 K/UL (ref 4.1–11.1)
WBC URNS QL MICRO: NORMAL /HPF (ref 0–4)

## 2020-01-20 PROCEDURE — 81001 URINALYSIS AUTO W/SCOPE: CPT

## 2020-01-20 PROCEDURE — 85730 THROMBOPLASTIN TIME PARTIAL: CPT

## 2020-01-20 PROCEDURE — 93005 ELECTROCARDIOGRAM TRACING: CPT

## 2020-01-20 PROCEDURE — 85025 COMPLETE CBC W/AUTO DIFF WBC: CPT

## 2020-01-20 PROCEDURE — 86900 BLOOD TYPING SEROLOGIC ABO: CPT

## 2020-01-20 PROCEDURE — 85610 PROTHROMBIN TIME: CPT

## 2020-01-20 PROCEDURE — 71046 X-RAY EXAM CHEST 2 VIEWS: CPT

## 2020-01-20 PROCEDURE — 36415 COLL VENOUS BLD VENIPUNCTURE: CPT

## 2020-01-20 PROCEDURE — 80053 COMPREHEN METABOLIC PANEL: CPT

## 2020-01-20 NOTE — PERIOP NOTES
Incentive Spirometer        Using the incentive spirometer helps expand the small air sacs of your lungs, helps you breathe deeply, and helps improve your lung function. Use your incentive spirometer twice a day (10 breaths each time) prior to surgery. How to Use Your Incentive Spirometer:  1. Hold the incentive spirometer in an upright position. 2. Breathe out as usual.   3. Place the mouthpiece in your mouth and seal your lips tightly around it. 4. Take a deep breath. Breathe in slowly and as deeply as possible. Keep the blue flow rate guide between the arrows. 5. Hold your breath as long as possible. Then exhale slowly and allow the piston to fall to the bottom of the column. 6. Rest for a few seconds and repeat steps one through five at least 10 times. PAT Tidal Volume____________2500______  x_________2_______  Date____________1/20/20___________    Christin Lean THE INCENTIVE SPIROMETER WITH YOU TO THE HOSPITAL ON THE DAY OF YOUR SURGERY. Opportunity given to ask and answer questions as well as to observe return demonstration.     Patient signature_____________________________    Witness____________________________

## 2020-01-20 NOTE — PERIOP NOTES
[facility]  Preoperative Instructions        Surgery Date 1/27/20       Time of Arrival 0545  Contact #[phone]  1. On the day of your surgery, please report to the Surgical Services Registration Desk and sign in at your designated time. The Surgery Center is located to the right of the Emergency Room. 2. You must have someone with you to drive you home. You should not drive a car for 24 hours following surgery. Please make arrangements for a friend or family member to stay with you for the first 24 hours after your surgery. 3. Do not have anything to eat or drink (including water, gum, mints, coffee, juice) after midnight ??1/26/20      . ? This may not apply to medications prescribed by your physician. ?(Please note below the special instructions with medications to take the morning of your procedure.)    4. We recommend you do not drink any alcoholic beverages for 24 hours before and after your surgery. 5. Contact your surgeons office for instructions on the following medications: non-steroidal anti-inflammatory drugs (i.e. Advil, Aleve), vitamins, and supplements. (Some surgeons will want you to stop these medications prior to surgery and others may allow you to take them)  **If you are currently taking Plavix, Coumadin, Aspirin and/or other blood-thinning agents, contact your surgeon for instructions. ** Your surgeon will partner with the physician prescribing these medications to determine if it is safe to stop or if you need to continue taking. Please do not stop taking these medications without instructions from your surgeon    6. Wear comfortable clothes. Wear glasses instead of contacts. Do not bring any money or jewelry. Please bring picture ID, insurance card, and any prearranged co-payment or hospital payment. Do not wear make-up, particularly mascara the morning of your surgery. Do not wear nail polish, particularly if you are having foot /hand surgery. Wear your hair loose or down, no ponytails, buns, ksenia pins or clips. All body piercings must be removed. Please shower with antibacterial soap for three consecutive days before and on the morning of surgery, but do not apply any lotions, powders or deodorants after the shower on the day of surgery. Please use a fresh towels after each shower. Please sleep in clean clothes and change bed linens the night before surgery. Please do not shave for 48 hours prior to surgery. Shaving of the face is acceptable. 7. You should understand that if you do not follow these instructions your surgery may be cancelled. If your physical condition changes (I.e. fever, cold or flu) please contact your surgeon as soon as possible. 8. It is important that you be on time. If a situation occurs where you may be late, please call [phone] (OR Holding Area). 9. If you have any questions and or problems, please call [phone] (Pre-admission Testing). 10. Your surgery time may be subject to change. You will receive a phone call the evening prior if your time changes. 11.  If having outpatient surgery, you must have someone to drive you here, stay with you during the duration of your stay, and to drive you home at time of discharge. 12.   In an effort to improve the efficiency, privacy, and safety for all of our Pre-op patients visitors are not allowed in the Holding area. Once you arrive and are registered your family/visitors will be asked to remain in the waiting room. The Pre-op staff will get you from the Surgical Waiting Area and will explain to you and your family/visitors that the Pre-op phase is beginning. The staff will answer any questions and provide instructions for tracking of the patient, by use of the existing tracking number and color-coded status board in the waiting room.   At this time the staff will also ask for your designated spokesperson information in the event that the physician or staff need to provide an update or obtain any pertinent information. The designated spokesperson will be notified if the physician needs to speak to family during the pre-operative phase. If at any time your family/visitors has questions or concerns they may approach the volunteer desk in the waiting area for assistance. Special Instructions: Follow surgeon directions    TAKE ALL MEDICATIONS DAY OF SURGERY EXCEPT: Halobestasol      I understand a pre-operative phone call will be made to verify my surgery time. In the event that I am not available, I give permission for a message to be left on my answering service and/or with another person?   yes         ___________________      __________   _________    (Signature of Patient)             (Witness)                (Date and Time)

## 2020-01-22 LAB
ATRIAL RATE: 75 BPM
CALCULATED R AXIS, ECG10: -84 DEGREES
CALCULATED T AXIS, ECG11: 35 DEGREES
DIAGNOSIS, 93000: NORMAL
Q-T INTERVAL, ECG07: 426 MS
QRS DURATION, ECG06: 144 MS
QTC CALCULATION (BEZET), ECG08: 475 MS
VENTRICULAR RATE, ECG03: 75 BPM

## 2020-01-22 NOTE — ADVANCED PRACTICE NURSE
1/22/20 0900:  EKG from 1/20/20 reviewed by Dr. Florencia Fuller , anesthesiologist.  No previous EKG available. Planned procedure, PMHx, functional status reviewed. Pt needs cardiac evaluation prior to planned procedure. Luciana at surgeon's office notified and will schedule pt's cardiac clearance per anesthesiologist's request. .

## 2020-01-23 NOTE — PERIOP NOTES
Spoke with Luciana in Dr. Graciela Holland office. Pt surgery cancelled until after cardiac clearance appt on 1/27/20 with [facility]. Pt to be rescheduled for surgery once appointment is completed with cardiac clearance.

## 2020-02-19 ENCOUNTER — HOSPITAL ENCOUNTER (OUTPATIENT)
Dept: PREADMISSION TESTING | Age: 70
Discharge: HOME | End: 2020-02-19
Attending: SURGERY
Payer: MEDICAID

## 2020-02-19 VITALS
HEIGHT: 69 IN | TEMPERATURE: 98.2 F | WEIGHT: 188.27 LBS | RESPIRATION RATE: 24 BRPM | HEART RATE: 78 BPM | DIASTOLIC BLOOD PRESSURE: 65 MMHG | SYSTOLIC BLOOD PRESSURE: 172 MMHG | BODY MASS INDEX: 27.89 KG/M2 | OXYGEN SATURATION: 100 %

## 2020-02-19 LAB
ABO + RH BLD: NORMAL
ALBUMIN SERPL-MCNC: 3.6 G/DL (ref 3.5–5)
ALBUMIN/GLOB SERPL: 0.9 {RATIO} (ref 1.1–2.2)
ALP SERPL-CCNC: 75 U/L (ref 45–117)
ALT SERPL-CCNC: 22 U/L (ref 12–78)
ANION GAP SERPL CALC-SCNC: 2 MMOL/L (ref 5–15)
APPEARANCE UR: CLEAR
APTT PPP: 25.4 SEC (ref 22.1–32)
AST SERPL-CCNC: 20 U/L (ref 15–37)
BACTERIA URNS QL MICRO: NEGATIVE /HPF
BILIRUB SERPL-MCNC: 0.3 MG/DL (ref 0.2–1)
BILIRUB UR QL: NEGATIVE
BLOOD GROUP ANTIBODIES SERPL: NORMAL
BUN SERPL-MCNC: 11 MG/DL (ref 6–20)
BUN/CREAT SERPL: 8 (ref 12–20)
CALCIUM SERPL-MCNC: 8.4 MG/DL (ref 8.5–10.1)
CHLORIDE SERPL-SCNC: 106 MMOL/L (ref 97–108)
CO2 SERPL-SCNC: 29 MMOL/L (ref 21–32)
COLOR UR: NORMAL
CREAT SERPL-MCNC: 1.45 MG/DL (ref 0.7–1.3)
EPITH CASTS URNS QL MICRO: NORMAL /LPF
ERYTHROCYTE [DISTWIDTH] IN BLOOD BY AUTOMATED COUNT: 12.4 % (ref 11.5–14.5)
GLOBULIN SER CALC-MCNC: 4 G/DL (ref 2–4)
GLUCOSE SERPL-MCNC: 103 MG/DL (ref 65–100)
GLUCOSE UR STRIP.AUTO-MCNC: NEGATIVE MG/DL
HCT VFR BLD AUTO: 42.1 % (ref 36.6–50.3)
HGB BLD-MCNC: 14 G/DL (ref 12.1–17)
HGB UR QL STRIP: NEGATIVE
HYALINE CASTS URNS QL MICRO: NORMAL /LPF (ref 0–5)
INR PPP: 0.9 (ref 0.9–1.1)
KETONES UR QL STRIP.AUTO: NEGATIVE MG/DL
LEUKOCYTE ESTERASE UR QL STRIP.AUTO: NEGATIVE
MCH RBC QN AUTO: 33.6 PG (ref 26–34)
MCHC RBC AUTO-ENTMCNC: 33.3 G/DL (ref 30–36.5)
MCV RBC AUTO: 101 FL (ref 80–99)
NITRITE UR QL STRIP.AUTO: NEGATIVE
NRBC # BLD: 0 K/UL (ref 0–0.01)
NRBC BLD-RTO: 0 PER 100 WBC
PH UR STRIP: 6 [PH] (ref 5–8)
PLATELET # BLD AUTO: 303 K/UL (ref 150–400)
PMV BLD AUTO: 10 FL (ref 8.9–12.9)
POTASSIUM SERPL-SCNC: 3.7 MMOL/L (ref 3.5–5.1)
PROT SERPL-MCNC: 7.6 G/DL (ref 6.4–8.2)
PROT UR STRIP-MCNC: NEGATIVE MG/DL
PROTHROMBIN TIME: 9.5 SEC (ref 9–11.1)
RBC # BLD AUTO: 4.17 M/UL (ref 4.1–5.7)
RBC #/AREA URNS HPF: NORMAL /HPF (ref 0–5)
SODIUM SERPL-SCNC: 137 MMOL/L (ref 136–145)
SP GR UR REFRACTOMETRY: 1.02 (ref 1–1.03)
SPECIMEN EXP DATE BLD: NORMAL
THERAPEUTIC RANGE,PTTT: NORMAL SECS (ref 58–77)
UA: UC IF INDICATED,UAUC: NORMAL
UROBILINOGEN UR QL STRIP.AUTO: 0.2 EU/DL (ref 0.2–1)
WBC # BLD AUTO: 8 K/UL (ref 4.1–11.1)
WBC URNS QL MICRO: NORMAL /HPF (ref 0–4)

## 2020-02-19 PROCEDURE — 81001 URINALYSIS AUTO W/SCOPE: CPT

## 2020-02-19 PROCEDURE — 85027 COMPLETE CBC AUTOMATED: CPT

## 2020-02-19 PROCEDURE — 80053 COMPREHEN METABOLIC PANEL: CPT

## 2020-02-19 PROCEDURE — 85610 PROTHROMBIN TIME: CPT

## 2020-02-19 PROCEDURE — 86900 BLOOD TYPING SEROLOGIC ABO: CPT

## 2020-02-19 PROCEDURE — 85730 THROMBOPLASTIN TIME PARTIAL: CPT

## 2020-02-19 PROCEDURE — 36415 COLL VENOUS BLD VENIPUNCTURE: CPT

## 2020-02-19 RX ORDER — SODIUM CHLORIDE, SODIUM LACTATE, POTASSIUM CHLORIDE, CALCIUM CHLORIDE 600; 310; 30; 20 MG/100ML; MG/100ML; MG/100ML; MG/100ML
25 INJECTION, SOLUTION INTRAVENOUS CONTINUOUS
Status: CANCELLED | OUTPATIENT
Start: 2020-02-26

## 2020-02-19 RX ORDER — OLOPATADINE HYDROCHLORIDE 600 UG/1
2 SPRAY, METERED NASAL AS NEEDED
COMMUNITY

## 2020-02-19 NOTE — PERIOP NOTES
[facility]  Preoperative Instructions        Surgery Date 2/26/20          Time of Arrival 0615    1. On the day of your surgery, please report to the Surgical Services Registration Desk and sign in at your designated time. The Surgery Center is located to the right of the Emergency Room. 2. You must have someone with you to drive you home. You should not drive a car for 24 hours following surgery. Please make arrangements for a friend or family member to stay with you for the first 24 hours after your surgery. 3. Do not have anything to eat or drink (including water, gum, mints, coffee, juice) after midnight ?2/25/20? aJs Burgos ? This may not apply to medications prescribed by your physician. ?(Please note below the special instructions with medications to take the morning of your procedure.)    4. We recommend you do not drink any alcoholic beverages for 24 hours before and after your surgery. 5. Contact your surgeons office for instructions on the following medications: non-steroidal anti-inflammatory drugs (i.e. Advil, Aleve), vitamins, and supplements. (Some surgeons will want you to stop these medications prior to surgery and others may allow you to take them)  **If you are currently taking Plavix, Coumadin, Aspirin and/or other blood-thinning agents, contact your surgeon for instructions. ** Your surgeon will partner with the physician prescribing these medications to determine if it is safe to stop or if you need to continue taking. Please do not stop taking these medications without instructions from your surgeon    6. Wear comfortable clothes. Wear glasses instead of contacts. Do not bring any money or jewelry. Please bring picture ID, insurance card, and any prearranged co-payment or hospital payment. Do not wear make-up, particularly mascara the morning of your surgery. Do not wear nail polish, particularly if you are having foot /hand surgery.   Wear your hair loose or down, no ponytails, buns, ksenia pins or clips. All body piercings must be removed. Please shower with antibacterial soap for three consecutive days before and on the morning of surgery, but do not apply any lotions, powders or deodorants after the shower on the day of surgery. Please use a fresh towels after each shower. Please sleep in clean clothes and change bed linens the night before surgery. Please do not shave for 48 hours prior to surgery. Shaving of the face is acceptable. 7. You should understand that if you do not follow these instructions your surgery may be cancelled. If your physical condition changes (I.e. fever, cold or flu) please contact your surgeon as soon as possible. 8. It is important that you be on time. If a situation occurs where you may be late, please call [phone] (OR Holding Area). 9. If you have any questions and or problems, please call [phone] (Pre-admission Testing). 10. Your surgery time may be subject to change. You will receive a phone call the evening prior if your time changes. 11.  If having outpatient surgery, you must have someone to drive you here, stay with you during the duration of your stay, and to drive you home at time of discharge. 12.   In an effort to improve the efficiency, privacy, and safety for all of our Pre-op patients visitors are not allowed in the Holding area. Once you arrive and are registered your family/visitors will be asked to remain in the waiting room. The Pre-op staff will get you from the Surgical Waiting Area and will explain to you and your family/visitors that the Pre-op phase is beginning. The staff will answer any questions and provide instructions for tracking of the patient, by use of the existing tracking number and color-coded status board in the waiting room.   At this time the staff will also ask for your designated spokesperson information in the event that the physician or staff need to provide an update or obtain any pertinent information. The designated spokesperson will be notified if the physician needs to speak to family during the pre-operative phase. If at any time your family/visitors has questions or concerns they may approach the volunteer desk in the waiting area for assistance. Special Instructions:    TAKE ALL MEDICATIONS DAY OF SURGERY EXCEPT:  none    I understand a pre-operative phone call will be made to verify my surgery time. In the event that I am not available, I give permission for a message to be left on my answering service and/or with another person?   No [phone]         ___________________      __________   _________    (Signature of Patient)             (Witness)                (Date and Time)

## 2020-02-24 NOTE — PERIOP NOTES
Spoke to Dr. Butcher Falling office for recent history and physical if available (12/19 most recent). Office states aware of pre-op chemistry. No further orders.

## 2020-02-26 ENCOUNTER — ANESTHESIA (OUTPATIENT)
Dept: SURGERY | Age: 70
DRG: 181 | End: 2020-02-26
Payer: MEDICAID

## 2020-02-26 ENCOUNTER — ANESTHESIA EVENT (OUTPATIENT)
Dept: SURGERY | Age: 70
DRG: 181 | End: 2020-02-26
Payer: MEDICAID

## 2020-02-26 ENCOUNTER — HOSPITAL ENCOUNTER (INPATIENT)
Age: 70
LOS: 7 days | Discharge: HOME | DRG: 181 | End: 2020-03-04
Attending: SURGERY | Admitting: SURGERY
Payer: MEDICAID

## 2020-02-26 PROBLEM — I74.5 ILIAC ARTERY OCCLUSION, RIGHT (HCC): Status: ACTIVE | Noted: 2020-02-26

## 2020-02-26 PROCEDURE — 74011000272 HC RX REV CODE- 272: Performed by: SURGERY

## 2020-02-26 PROCEDURE — 041L0JH BYPASS LEFT FEMORAL ARTERY TO RIGHT FEMORAL ARTERY WITH SYNTHETIC SUBSTITUTE, OPEN APPROACH: ICD-10-PCS | Performed by: SURGERY

## 2020-02-26 PROCEDURE — 77030005513 HC CATH URETH FOL11 MDII -B: Performed by: SURGERY

## 2020-02-26 PROCEDURE — 74011250637 HC RX REV CODE- 250/637: Performed by: SURGERY

## 2020-02-26 PROCEDURE — 77030026438 HC STYL ET INTUB CARD -A: Performed by: NURSE ANESTHETIST, CERTIFIED REGISTERED

## 2020-02-26 PROCEDURE — 74011250636 HC RX REV CODE- 250/636: Performed by: SURGERY

## 2020-02-26 PROCEDURE — 74011000250 HC RX REV CODE- 250: Performed by: NURSE ANESTHETIST, CERTIFIED REGISTERED

## 2020-02-26 PROCEDURE — 76060000034 HC ANESTHESIA 1.5 TO 2 HR: Performed by: SURGERY

## 2020-02-26 PROCEDURE — 77030002924 HC SUT GORTX WLGO -B: Performed by: SURGERY

## 2020-02-26 PROCEDURE — 77030020263 HC SOL INJ SOD CL0.9% LFCR 1000ML: Performed by: SURGERY

## 2020-02-26 PROCEDURE — 77030040922 HC BLNKT HYPOTHRM STRY -A

## 2020-02-26 PROCEDURE — 77030019908 HC STETH ESOPH SIMS -A: Performed by: NURSE ANESTHETIST, CERTIFIED REGISTERED

## 2020-02-26 PROCEDURE — 77030002996 HC SUT SLK J&J -A: Performed by: SURGERY

## 2020-02-26 PROCEDURE — 77030008463 HC STPLR SKN PROX J&J -B: Performed by: SURGERY

## 2020-02-26 PROCEDURE — 74011250636 HC RX REV CODE- 250/636: Performed by: ANESTHESIOLOGY

## 2020-02-26 PROCEDURE — 77030031139 HC SUT VCRL2 J&J -A: Performed by: SURGERY

## 2020-02-26 PROCEDURE — 77030008684 HC TU ET CUF COVD -B: Performed by: NURSE ANESTHETIST, CERTIFIED REGISTERED

## 2020-02-26 PROCEDURE — C1768 GRAFT, VASCULAR: HCPCS | Performed by: SURGERY

## 2020-02-26 PROCEDURE — 77030020256 HC SOL INJ NACL 0.9%  500ML: Performed by: SURGERY

## 2020-02-26 PROCEDURE — 77030020061 HC IV BLD WRMR ADMIN SET 3M -B: Performed by: NURSE ANESTHETIST, CERTIFIED REGISTERED

## 2020-02-26 PROCEDURE — 65270000029 HC RM PRIVATE

## 2020-02-26 PROCEDURE — 74011250636 HC RX REV CODE- 250/636: Performed by: NURSE ANESTHETIST, CERTIFIED REGISTERED

## 2020-02-26 PROCEDURE — 77030014008 HC SPNG HEMSTAT J&J -C: Performed by: SURGERY

## 2020-02-26 PROCEDURE — 77030018836 HC SOL IRR NACL ICUM -A: Performed by: SURGERY

## 2020-02-26 PROCEDURE — 74011000250 HC RX REV CODE- 250: Performed by: SURGERY

## 2020-02-26 PROCEDURE — 76210000016 HC OR PH I REC 1 TO 1.5 HR: Performed by: SURGERY

## 2020-02-26 PROCEDURE — 77030018673: Performed by: SURGERY

## 2020-02-26 PROCEDURE — 76010000162 HC OR TIME 1.5 TO 2 HR INTENSV-TIER 1: Performed by: SURGERY

## 2020-02-26 PROCEDURE — C1769 GUIDE WIRE: HCPCS | Performed by: SURGERY

## 2020-02-26 PROCEDURE — 77030011640 HC PAD GRND REM COVD -A: Performed by: SURGERY

## 2020-02-26 DEVICE — PROPATEN VASCULAR GRAFT TW RR 8MMX40CM 30CM RINGS HEPARIN
Type: IMPLANTABLE DEVICE | Site: GROIN | Status: FUNCTIONAL
Brand: GORE PROPATEN VASCULAR GRAFT

## 2020-02-26 RX ORDER — SODIUM CHLORIDE 9 MG/ML
75 INJECTION, SOLUTION INTRAVENOUS CONTINUOUS
Status: DISCONTINUED | OUTPATIENT
Start: 2020-02-26 | End: 2020-02-27

## 2020-02-26 RX ORDER — AMLODIPINE BESYLATE 5 MG/1
5 TABLET ORAL DAILY
Status: DISCONTINUED | OUTPATIENT
Start: 2020-02-27 | End: 2020-03-04 | Stop reason: HOSPADM

## 2020-02-26 RX ORDER — ROCURONIUM BROMIDE 10 MG/ML
INJECTION, SOLUTION INTRAVENOUS AS NEEDED
Status: DISCONTINUED | OUTPATIENT
Start: 2020-02-26 | End: 2020-02-26 | Stop reason: HOSPADM

## 2020-02-26 RX ORDER — HEPARIN SODIUM 1000 [USP'U]/ML
INJECTION, SOLUTION INTRAVENOUS; SUBCUTANEOUS AS NEEDED
Status: DISCONTINUED | OUTPATIENT
Start: 2020-02-26 | End: 2020-02-26 | Stop reason: HOSPADM

## 2020-02-26 RX ORDER — SODIUM CHLORIDE 0.9 % (FLUSH) 0.9 %
5-40 SYRINGE (ML) INJECTION EVERY 8 HOURS
Status: DISCONTINUED | OUTPATIENT
Start: 2020-02-26 | End: 2020-02-26 | Stop reason: HOSPADM

## 2020-02-26 RX ORDER — MORPHINE SULFATE 10 MG/ML
2 INJECTION, SOLUTION INTRAMUSCULAR; INTRAVENOUS
Status: DISCONTINUED | OUTPATIENT
Start: 2020-02-26 | End: 2020-02-26 | Stop reason: HOSPADM

## 2020-02-26 RX ORDER — LIDOCAINE HYDROCHLORIDE 20 MG/ML
INJECTION, SOLUTION EPIDURAL; INFILTRATION; INTRACAUDAL; PERINEURAL AS NEEDED
Status: DISCONTINUED | OUTPATIENT
Start: 2020-02-26 | End: 2020-02-26 | Stop reason: HOSPADM

## 2020-02-26 RX ORDER — PROPRANOLOL HYDROCHLORIDE 10 MG/1
40 TABLET ORAL 2 TIMES DAILY
Status: DISCONTINUED | OUTPATIENT
Start: 2020-02-26 | End: 2020-03-02

## 2020-02-26 RX ORDER — PROPRANOLOL HYDROCHLORIDE 80 MG/1
80 TABLET ORAL 2 TIMES DAILY
Status: DISCONTINUED | OUTPATIENT
Start: 2020-02-26 | End: 2020-03-02

## 2020-02-26 RX ORDER — LABETALOL HYDROCHLORIDE 5 MG/ML
INJECTION, SOLUTION INTRAVENOUS AS NEEDED
Status: DISCONTINUED | OUTPATIENT
Start: 2020-02-26 | End: 2020-02-26 | Stop reason: HOSPADM

## 2020-02-26 RX ORDER — ONDANSETRON 2 MG/ML
INJECTION INTRAMUSCULAR; INTRAVENOUS AS NEEDED
Status: DISCONTINUED | OUTPATIENT
Start: 2020-02-26 | End: 2020-02-26 | Stop reason: HOSPADM

## 2020-02-26 RX ORDER — DIPHENHYDRAMINE HYDROCHLORIDE 50 MG/ML
12.5 INJECTION, SOLUTION INTRAMUSCULAR; INTRAVENOUS AS NEEDED
Status: DISCONTINUED | OUTPATIENT
Start: 2020-02-26 | End: 2020-02-26 | Stop reason: HOSPADM

## 2020-02-26 RX ORDER — SODIUM CHLORIDE, SODIUM LACTATE, POTASSIUM CHLORIDE, CALCIUM CHLORIDE 600; 310; 30; 20 MG/100ML; MG/100ML; MG/100ML; MG/100ML
25 INJECTION, SOLUTION INTRAVENOUS CONTINUOUS
Status: DISCONTINUED | OUTPATIENT
Start: 2020-02-26 | End: 2020-02-26 | Stop reason: HOSPADM

## 2020-02-26 RX ORDER — SUCCINYLCHOLINE CHLORIDE 20 MG/ML
INJECTION INTRAMUSCULAR; INTRAVENOUS AS NEEDED
Status: DISCONTINUED | OUTPATIENT
Start: 2020-02-26 | End: 2020-02-26 | Stop reason: HOSPADM

## 2020-02-26 RX ORDER — FENTANYL CITRATE 50 UG/ML
INJECTION, SOLUTION INTRAMUSCULAR; INTRAVENOUS AS NEEDED
Status: DISCONTINUED | OUTPATIENT
Start: 2020-02-26 | End: 2020-02-26 | Stop reason: HOSPADM

## 2020-02-26 RX ORDER — SODIUM CHLORIDE 0.9 % (FLUSH) 0.9 %
5-40 SYRINGE (ML) INJECTION AS NEEDED
Status: DISCONTINUED | OUTPATIENT
Start: 2020-02-26 | End: 2020-02-26 | Stop reason: HOSPADM

## 2020-02-26 RX ORDER — PROPOFOL 10 MG/ML
INJECTION, EMULSION INTRAVENOUS AS NEEDED
Status: DISCONTINUED | OUTPATIENT
Start: 2020-02-26 | End: 2020-02-26 | Stop reason: HOSPADM

## 2020-02-26 RX ORDER — SODIUM CHLORIDE, SODIUM LACTATE, POTASSIUM CHLORIDE, CALCIUM CHLORIDE 600; 310; 30; 20 MG/100ML; MG/100ML; MG/100ML; MG/100ML
25 INJECTION, SOLUTION INTRAVENOUS CONTINUOUS
Status: DISCONTINUED | OUTPATIENT
Start: 2020-02-26 | End: 2020-02-26

## 2020-02-26 RX ORDER — GLYCOPYRROLATE 0.2 MG/ML
INJECTION INTRAMUSCULAR; INTRAVENOUS AS NEEDED
Status: DISCONTINUED | OUTPATIENT
Start: 2020-02-26 | End: 2020-02-26 | Stop reason: HOSPADM

## 2020-02-26 RX ORDER — DEXAMETHASONE SODIUM PHOSPHATE 4 MG/ML
INJECTION, SOLUTION INTRA-ARTICULAR; INTRALESIONAL; INTRAMUSCULAR; INTRAVENOUS; SOFT TISSUE AS NEEDED
Status: DISCONTINUED | OUTPATIENT
Start: 2020-02-26 | End: 2020-02-26 | Stop reason: HOSPADM

## 2020-02-26 RX ORDER — ONDANSETRON 2 MG/ML
4 INJECTION INTRAMUSCULAR; INTRAVENOUS AS NEEDED
Status: DISCONTINUED | OUTPATIENT
Start: 2020-02-26 | End: 2020-02-26 | Stop reason: HOSPADM

## 2020-02-26 RX ORDER — IPRATROPIUM BROMIDE 42 UG/1
2 SPRAY, METERED NASAL DAILY
Status: DISCONTINUED | OUTPATIENT
Start: 2020-02-27 | End: 2020-03-04 | Stop reason: HOSPADM

## 2020-02-26 RX ORDER — PANTOPRAZOLE SODIUM 40 MG/1
40 TABLET, DELAYED RELEASE ORAL
Status: DISCONTINUED | OUTPATIENT
Start: 2020-02-27 | End: 2020-02-28

## 2020-02-26 RX ORDER — TRAMADOL HYDROCHLORIDE 50 MG/1
50 TABLET ORAL
Status: DISCONTINUED | OUTPATIENT
Start: 2020-02-26 | End: 2020-02-28

## 2020-02-26 RX ORDER — HYDROMORPHONE HYDROCHLORIDE 1 MG/ML
.2-.5 INJECTION, SOLUTION INTRAMUSCULAR; INTRAVENOUS; SUBCUTANEOUS
Status: DISCONTINUED | OUTPATIENT
Start: 2020-02-26 | End: 2020-02-26 | Stop reason: HOSPADM

## 2020-02-26 RX ORDER — HYDROMORPHONE HYDROCHLORIDE 2 MG/ML
INJECTION, SOLUTION INTRAMUSCULAR; INTRAVENOUS; SUBCUTANEOUS AS NEEDED
Status: DISCONTINUED | OUTPATIENT
Start: 2020-02-26 | End: 2020-02-26 | Stop reason: HOSPADM

## 2020-02-26 RX ORDER — MORPHINE SULFATE 2 MG/ML
2 INJECTION, SOLUTION INTRAMUSCULAR; INTRAVENOUS
Status: DISCONTINUED | OUTPATIENT
Start: 2020-02-26 | End: 2020-02-28

## 2020-02-26 RX ORDER — NEOSTIGMINE METHYLSULFATE 1 MG/ML
INJECTION INTRAVENOUS AS NEEDED
Status: DISCONTINUED | OUTPATIENT
Start: 2020-02-26 | End: 2020-02-26 | Stop reason: HOSPADM

## 2020-02-26 RX ORDER — FENTANYL CITRATE 50 UG/ML
25 INJECTION, SOLUTION INTRAMUSCULAR; INTRAVENOUS
Status: DISCONTINUED | OUTPATIENT
Start: 2020-02-26 | End: 2020-02-26 | Stop reason: HOSPADM

## 2020-02-26 RX ORDER — LABETALOL HYDROCHLORIDE 5 MG/ML
INJECTION, SOLUTION INTRAVENOUS
Status: COMPLETED
Start: 2020-02-26 | End: 2020-02-26

## 2020-02-26 RX ADMIN — NEOSTIGMINE METHYLSULFATE 4 MG: 1 INJECTION INTRAVENOUS at 11:39

## 2020-02-26 RX ADMIN — ROCURONIUM BROMIDE 45 MG: 10 INJECTION INTRAVENOUS at 10:15

## 2020-02-26 RX ADMIN — HEPARIN SODIUM 7000 UNITS: 1000 INJECTION, SOLUTION INTRAVENOUS; SUBCUTANEOUS at 10:45

## 2020-02-26 RX ADMIN — FENTANYL CITRATE 50 MCG: 50 INJECTION, SOLUTION INTRAMUSCULAR; INTRAVENOUS at 10:23

## 2020-02-26 RX ADMIN — LABETALOL HYDROCHLORIDE 20 MG: 5 INJECTION, SOLUTION INTRAVENOUS at 11:56

## 2020-02-26 RX ADMIN — ROCURONIUM BROMIDE 25 MG: 10 INJECTION INTRAVENOUS at 11:00

## 2020-02-26 RX ADMIN — DEXAMETHASONE SODIUM PHOSPHATE 8 MG: 4 INJECTION, SOLUTION INTRAMUSCULAR; INTRAVENOUS at 10:12

## 2020-02-26 RX ADMIN — GLYCOPYRROLATE 0.6 MG: 0.2 INJECTION, SOLUTION INTRAMUSCULAR; INTRAVENOUS at 11:39

## 2020-02-26 RX ADMIN — SODIUM CHLORIDE 75 ML/HR: 900 INJECTION, SOLUTION INTRAVENOUS at 12:44

## 2020-02-26 RX ADMIN — WATER 2 G: 1 INJECTION INTRAMUSCULAR; INTRAVENOUS; SUBCUTANEOUS at 21:44

## 2020-02-26 RX ADMIN — ONDANSETRON HYDROCHLORIDE 4 MG: 2 INJECTION, SOLUTION INTRAMUSCULAR; INTRAVENOUS at 11:33

## 2020-02-26 RX ADMIN — ROCURONIUM BROMIDE 5 MG: 10 INJECTION INTRAVENOUS at 10:05

## 2020-02-26 RX ADMIN — PHENYLEPHRINE HYDROCHLORIDE 50 MCG/MIN: 10 INJECTION INTRAVENOUS at 10:13

## 2020-02-26 RX ADMIN — PROPRANOLOL HYDROCHLORIDE 40 MG: 10 TABLET ORAL at 18:15

## 2020-02-26 RX ADMIN — MORPHINE SULFATE 2 MG: 2 INJECTION, SOLUTION INTRAMUSCULAR; INTRAVENOUS at 18:22

## 2020-02-26 RX ADMIN — SUCCINYLCHOLINE CHLORIDE 140 MG: 20 INJECTION, SOLUTION INTRAMUSCULAR; INTRAVENOUS at 10:05

## 2020-02-26 RX ADMIN — Medication 1 AMPULE: at 18:16

## 2020-02-26 RX ADMIN — Medication 1 AMPULE: at 21:00

## 2020-02-26 RX ADMIN — WATER 2 G: 1 INJECTION INTRAMUSCULAR; INTRAVENOUS; SUBCUTANEOUS at 18:16

## 2020-02-26 RX ADMIN — PROPOFOL 140 MG: 10 INJECTION, EMULSION INTRAVENOUS at 10:05

## 2020-02-26 RX ADMIN — PROPRANOLOL HYDROCHLORIDE 80 MG: 80 TABLET ORAL at 18:15

## 2020-02-26 RX ADMIN — WATER 2 G: 1 INJECTION INTRAMUSCULAR; INTRAVENOUS; SUBCUTANEOUS at 10:09

## 2020-02-26 RX ADMIN — HYDROMORPHONE HYDROCHLORIDE 0.5 MG: 2 INJECTION, SOLUTION INTRAMUSCULAR; INTRAVENOUS; SUBCUTANEOUS at 10:33

## 2020-02-26 RX ADMIN — Medication 3 AMPULE: at 07:28

## 2020-02-26 RX ADMIN — MORPHINE SULFATE 2 MG: 2 INJECTION, SOLUTION INTRAMUSCULAR; INTRAVENOUS at 21:51

## 2020-02-26 RX ADMIN — FENTANYL CITRATE 50 MCG: 50 INJECTION, SOLUTION INTRAMUSCULAR; INTRAVENOUS at 10:05

## 2020-02-26 RX ADMIN — LIDOCAINE HYDROCHLORIDE 80 MG: 20 INJECTION, SOLUTION EPIDURAL; INFILTRATION; INTRACAUDAL; PERINEURAL at 10:05

## 2020-02-26 RX ADMIN — SODIUM CHLORIDE, SODIUM LACTATE, POTASSIUM CHLORIDE, AND CALCIUM CHLORIDE 25 ML/HR: 600; 310; 30; 20 INJECTION, SOLUTION INTRAVENOUS at 07:28

## 2020-02-26 NOTE — PERIOP NOTES
Handoff Report from Operating Room to PACU    Report received from [address] [place] [address] and JOSE Milton RN regarding Brandon Soto. Surgeon(s):  Jessica Sharma MD  And Procedure(s) (LRB):  FEMORAL-FEMORAL BYPASS (Bilateral)  confirmed   with allergies and dressings discussed. Anesthesia type, drugs, patient history, complications, estimated blood loss, vital signs, intake and output, and last pain medication, lines and temperature were reviewed.     1215: SBAR Report given to Boogie Ivey

## 2020-02-26 NOTE — ANESTHESIA POSTPROCEDURE EVALUATION
Procedure(s): FEMORAL-FEMORAL BYPASS. general    Anesthesia Post Evaluation        Patient location during evaluation: PACU  Note status: Adequate. Level of consciousness: responsive to verbal stimuli and sleepy but conscious  Pain management: satisfactory to patient  Airway patency: patent  Anesthetic complications: no  Cardiovascular status: acceptable  Respiratory status: acceptable  Hydration status: acceptable  Comments: +Post-Anesthesia Evaluation and Assessment    Patient: Brandon Soto MRN: [id number]  SSN: [id number]   YOB: 1950  Age: 68 y.o. Sex: male      Cardiovascular Function/Vital Signs    /57 (BP 1 Location: Right arm, BP Patient Position: At rest)   Pulse 73   Temp 36.7 °C (98.1 °F)   Resp 14   Ht 5' 9\" (1.753 m)   Wt 84.7 kg (186 lb 11.7 oz)   SpO2 93%   BMI 27.58 kg/m²     Patient is status post Procedure(s): FEMORAL-FEMORAL BYPASS. Nausea/Vomiting: Controlled. Postoperative hydration reviewed and adequate. Pain:  Pain Scale 1: Numeric (0 - 10) (02/26/20 1245)  Pain Intensity 1: 0 (02/26/20 1245)   Managed. Neurological Status:   Neuro (WDL): Exceptions to WDL (02/26/20 1230)   At baseline. Mental Status and Level of Consciousness: Arousable. Pulmonary Status:   O2 Device: Nasal cannula (02/26/20 1300)   Adequate oxygenation and airway patent. Complications related to anesthesia: None    Post-anesthesia assessment completed. No concerns. Signed By: Minh Shelby MD    2/26/2020  Post anesthesia nausea and vomiting:  controlled      Vitals Value Taken Time   /57 2/26/2020  1:00 PM   Temp 36.7 °C (98.1 °F) 2/26/2020 11:52 AM   Pulse 68 2/26/2020  1:11 PM   Resp 15 2/26/2020  1:11 PM   SpO2 94 % 2/26/2020  1:11 PM   Vitals shown include unvalidated device data.

## 2020-02-26 NOTE — BRIEF OP NOTE
BRIEF OPERATIVE NOTE    Date of Procedure: 2/26/2020   Preoperative Diagnosis: RLE ischemic rest pain  Postoperative Diagnosis: same    Procedure(s): Left => right femoral crossover graft (8mm ringed Propaten)  Surgeon: Rafaela Loco MD   Anesthesia: General   Estimated Blood Loss: minimal  Specimens: none   Findings:   Complications: none  Implants:   Implant Name Type Inv.  Item Serial No.  Lot No. LRB No. Used Action   GRAFT TW STRTCH RR [id number] -- PROPATEN - [id number]  GRAFT TW STRTCH RR [id number] -- PROPATEN [id number]  [employer] [id number] N/A 1 Implanted

## 2020-02-26 NOTE — ANESTHESIA PREPROCEDURE EVALUATION
Relevant Problems   No relevant active problems       Anesthetic History   No history of anesthetic complications            Review of Systems / Medical History  Patient summary reviewed, nursing notes reviewed and pertinent labs reviewed    Pulmonary          Smoker         Neuro/Psych         Headaches     Cardiovascular    Hypertension          PAD    Exercise tolerance: >4 METS     GI/Hepatic/Renal     GERD      PUD     Endo/Other        Arthritis     Other Findings   Comments: 3 drinks/week           Physical Exam    Airway  Mallampati: II  TM Distance: 4 - 6 cm  Neck ROM: normal range of motion   Mouth opening: Normal     Cardiovascular  Regular rate and rhythm,  S1 and S2 normal,  no murmur, click, rub, or gallop             Dental    Dentition: Poor dentition  Comments: Most teeth missing and those that remain are in very poor shape.    Pulmonary  Breath sounds clear to auscultation               Abdominal  GI exam deferred       Other Findings            Anesthetic Plan    ASA: 3  Anesthesia type: general            Anesthetic plan and risks discussed with: Patient

## 2020-02-26 NOTE — PERIOP NOTES
Called surgical waiting area, spoke with  and asked them to inform the family that start time of procedure was 20 mins ago

## 2020-02-26 NOTE — PERIOP NOTES
Patient's family has gone home. Patient's cell phone and wallet with patient. Offered to send to security, but he declines at this time. Awaiting room assignment.

## 2020-02-26 NOTE — PERIOP NOTES
TRANSFER - OUT REPORT:    Verbal report given to Harjinder Hubbard RN (name) on Kayleen Perry  being transferred to Gen Surg (unit) for routine post - op       Report consisted of patients Situation, Background, Assessment and   Recommendations(SBAR). Information from the following report(s) SBAR, Kardex, OR Summary, Procedure Summary, Intake/Output and MAR was reviewed with the receiving nurse. Lines:   Peripheral IV 02/26/20 Left Hand (Active)   Site Assessment Clean, dry, & intact 2/26/2020 11:50 AM   Phlebitis Assessment 0 2/26/2020 11:50 AM   Infiltration Assessment 0 2/26/2020 11:50 AM   Dressing Status Clean, dry, & intact 2/26/2020 11:50 AM   Dressing Type Transparent;Tape 2/26/2020 11:50 AM   Hub Color/Line Status Pink; Infusing 2/26/2020 11:50 AM        Opportunity for questions and clarification was provided. Patient transported with:   O2 @ 3 liters  Tech     Surgery desk updated with assigned room number. Patient belongings and walker with patient at time of transfer.

## 2020-02-27 LAB
ANION GAP SERPL CALC-SCNC: 8 MMOL/L (ref 5–15)
BUN SERPL-MCNC: 10 MG/DL (ref 6–20)
BUN/CREAT SERPL: 13 (ref 12–20)
CALCIUM SERPL-MCNC: 7.3 MG/DL (ref 8.5–10.1)
CHLORIDE SERPL-SCNC: 109 MMOL/L (ref 97–108)
CO2 SERPL-SCNC: 21 MMOL/L (ref 21–32)
CREAT SERPL-MCNC: 0.79 MG/DL (ref 0.7–1.3)
GLUCOSE SERPL-MCNC: 136 MG/DL (ref 65–100)
POTASSIUM SERPL-SCNC: 3.7 MMOL/L (ref 3.5–5.1)
SODIUM SERPL-SCNC: 138 MMOL/L (ref 136–145)

## 2020-02-27 PROCEDURE — 80048 BASIC METABOLIC PNL TOTAL CA: CPT

## 2020-02-27 PROCEDURE — 65270000029 HC RM PRIVATE

## 2020-02-27 PROCEDURE — 74011250637 HC RX REV CODE- 250/637: Performed by: SURGERY

## 2020-02-27 PROCEDURE — 74011250636 HC RX REV CODE- 250/636: Performed by: SURGERY

## 2020-02-27 PROCEDURE — 36415 COLL VENOUS BLD VENIPUNCTURE: CPT

## 2020-02-27 RX ORDER — CLOPIDOGREL BISULFATE 75 MG/1
75 TABLET ORAL DAILY
Status: DISCONTINUED | OUTPATIENT
Start: 2020-02-27 | End: 2020-03-04 | Stop reason: HOSPADM

## 2020-02-27 RX ADMIN — PROPRANOLOL HYDROCHLORIDE 40 MG: 10 TABLET ORAL at 09:16

## 2020-02-27 RX ADMIN — IPRATROPIUM BROMIDE 2 SPRAY: 42 SPRAY NASAL at 09:17

## 2020-02-27 RX ADMIN — PANTOPRAZOLE SODIUM 40 MG: 40 TABLET, DELAYED RELEASE ORAL at 09:16

## 2020-02-27 RX ADMIN — MORPHINE SULFATE 2 MG: 2 INJECTION, SOLUTION INTRAMUSCULAR; INTRAVENOUS at 09:20

## 2020-02-27 RX ADMIN — MORPHINE SULFATE 2 MG: 2 INJECTION, SOLUTION INTRAMUSCULAR; INTRAVENOUS at 01:09

## 2020-02-27 RX ADMIN — PROPRANOLOL HYDROCHLORIDE 80 MG: 80 TABLET ORAL at 09:16

## 2020-02-27 RX ADMIN — CLOPIDOGREL BISULFATE 75 MG: 75 TABLET ORAL at 09:16

## 2020-02-27 RX ADMIN — Medication 1 AMPULE: at 09:00

## 2020-02-27 RX ADMIN — PROPRANOLOL HYDROCHLORIDE 40 MG: 10 TABLET ORAL at 17:48

## 2020-02-27 RX ADMIN — AMLODIPINE BESYLATE 5 MG: 5 TABLET ORAL at 09:16

## 2020-02-27 RX ADMIN — PROPRANOLOL HYDROCHLORIDE 80 MG: 80 TABLET ORAL at 17:48

## 2020-02-27 RX ADMIN — SODIUM CHLORIDE 75 ML/HR: 900 INJECTION, SOLUTION INTRAVENOUS at 01:10

## 2020-02-27 RX ADMIN — Medication 1 AMPULE: at 22:02

## 2020-02-27 RX ADMIN — MORPHINE SULFATE 2 MG: 2 INJECTION, SOLUTION INTRAMUSCULAR; INTRAVENOUS at 15:53

## 2020-02-27 NOTE — PROGRESS NOTES
General Surgery End of Shift Nursing Note    Shift worked:   [id number]   Summary of shift:    Junior removed today. Patient complained of pain x2 today and was medicated with PRN pain medication (see MAR). Patient had a BM today, watery. Patient has been up in the chair today and was encouraged to continue to sit in the chair and walk. Patient voided appropriately post-junior removal.    Issues for physician to address:   None at this time. Number times ambulated in hallway past shift: 0    Number of times OOB to chair past shift: 2    Pain Management:  Current medication: Morphine, Tramadol  Patient states pain is manageable on current pain medication: YES    GI:    Current diet:  DIET CARDIAC Regular    Tolerating current diet: YES  Passing flatus: YES  Last Bowel Movement: today   Appearance: watery, brown    Respiratory:    Incentive Spirometer at bedside: YES  Patient instructed on use: YES    Patient Safety:    Falls Score: 2  Bed Alarm On? No  Sitter?  No    Sasha Murray LPN

## 2020-02-27 NOTE — PROGRESS NOTES
POD#1  Comfortable  R foot pain resolved but still has neuropathic tingling  VSS  Groins dry  R foot hot    Line and tubes out   Mobilize  ASA upsets stomach (which is not an allergy)   will try Plavix

## 2020-02-27 NOTE — PROGRESS NOTES
General Surgery End of Shift Nursing Note        Shift worked:   7pm to 7am    Summary of shift:   patient did not sleep much. Asked for pain meds x 2    Issues for physician to address:   none     Number times ambulated in hallway past shift: 0    Number of times OOB to chair past shift: 0    Pain Management:  Current medication: morphine   Patient states pain is manageable on current pain medication: YES    GI:    Current diet:  DIET CLEAR LIQUID    Tolerating current diet: YES  Passing flatus: NO  Last Bowel Movement: several days ago   Appearance: brown     Respiratory:    Incentive Spirometer at bedside: YES  Patient instructed on use: YES    Patient Safety:    Falls Score: 2  Bed Alarm On? No  Sitter?  No    Maryanne Arroyo

## 2020-02-27 NOTE — PROGRESS NOTES
1445 pt up to chair    General Surgery End of Shift Nursing Note    Bedside shift change report given to Nilson (oncoming nurse) by Ruben Church (offgoing nurse). Report included the following information SBAR, Kardex and MAR. Shift worked:   7a-7p   Summary of shift:    JEFERSON Sheldahllynne Bowen provided all care to pt. Writer only did assessments. Issues for physician to address:   none     Number times ambulated in hallway past shift: 0    Number of times OOB to chair past shift: 0    Pain Management:  Current medication: see mar  Patient states pain is manageable on current pain medication: YES    GI:    Current diet:  DIET CARDIAC Regular    Tolerating current diet: YES  Passing flatus: YES  Last Bowel Movement: today   Appearance: watery, brown    Respiratory:    Incentive Spirometer at bedside: YES  Patient instructed on use: YES    Patient Safety:    Falls Score: 2  Bed Alarm On? No  Sitter?  Dory Luciano

## 2020-02-27 NOTE — PROGRESS NOTES
General Surgery End of Shift Nursing Note    Shift worked:   6706-0606   Summary of shift:    Patient arrived from PACU today around 1600. Patient complained of pain x1 in the surgical areas and was medicated with PRN morphine (See MAR). Conti draining and patent. Patient has been resting. Dressings intact and in place. Issues for physician to address:   None at this time. Number times ambulated in hallway past shift: 0    Number of times OOB to chair past shift: 0    Pain Management:  Current medication: morphine, tramadol  Patient states pain is manageable on current pain medication: YES    GI:    Current diet:  DIET CLEAR LIQUID    Tolerating current diet: YES  Passing flatus: YES  Last Bowel Movement: yesterday   Appearance: unknown    Respiratory:    Incentive Spirometer at bedside: YES  Patient instructed on use: YES    Patient Safety:    Falls Score: 2  Bed Alarm On? No  Sitter?  No    Macarena Gilmore LPN

## 2020-02-27 NOTE — PROGRESS NOTES
General Surgery End of Shift Nursing Note    Bedside shift change report given to Dianna (oncoming nurse) by Nito Renee (offgoing nurse). Report included the following information SBAR, Kardex and MAR. Shift worked:   4p-7p   Summary of shift:    LPN Joni Koyanagi provided care for this pt. Writer did initial assessment and reassessment. Issues for physician to address:   none     Number times ambulated in hallway past shift: 0    Number of times OOB to chair past shift: 0    Pain Management:  Current medication: see mar  Patient states pain is manageable on current pain medication: YES    GI:    Current diet:  DIET CLEAR LIQUID    Tolerating current diet: YES  Passing flatus: NO  Last Bowel Movement: several days ago   Appearance: brown    Respiratory:    Incentive Spirometer at bedside: YES  Patient instructed on use: YES    Patient Safety:    Falls Score: 2  Bed Alarm On? No  Sitter?  No    Deanna Hamilton

## 2020-02-28 ENCOUNTER — APPOINTMENT (OUTPATIENT)
Dept: VASCULAR SURGERY | Age: 70
DRG: 181 | End: 2020-02-28
Attending: NURSE PRACTITIONER
Payer: MEDICAID

## 2020-02-28 PROCEDURE — 74011250637 HC RX REV CODE- 250/637: Performed by: NURSE PRACTITIONER

## 2020-02-28 PROCEDURE — 93922 UPR/L XTREMITY ART 2 LEVELS: CPT

## 2020-02-28 PROCEDURE — 74011250636 HC RX REV CODE- 250/636: Performed by: NURSE PRACTITIONER

## 2020-02-28 PROCEDURE — 74011250636 HC RX REV CODE- 250/636: Performed by: SURGERY

## 2020-02-28 PROCEDURE — 74011250637 HC RX REV CODE- 250/637: Performed by: SURGERY

## 2020-02-28 PROCEDURE — 65270000029 HC RM PRIVATE

## 2020-02-28 RX ORDER — IBUPROFEN 200 MG
1 TABLET ORAL DAILY
Status: DISCONTINUED | OUTPATIENT
Start: 2020-02-29 | End: 2020-03-04 | Stop reason: HOSPADM

## 2020-02-28 RX ORDER — HEPARIN SODIUM 5000 [USP'U]/ML
5000 INJECTION, SOLUTION INTRAVENOUS; SUBCUTANEOUS EVERY 8 HOURS
Status: DISCONTINUED | OUTPATIENT
Start: 2020-02-28 | End: 2020-03-02

## 2020-02-28 RX ORDER — OXYCODONE HYDROCHLORIDE 5 MG/1
2.5-5 TABLET ORAL
Status: DISCONTINUED | OUTPATIENT
Start: 2020-02-28 | End: 2020-03-04 | Stop reason: HOSPADM

## 2020-02-28 RX ORDER — FAMOTIDINE 20 MG/1
20 TABLET, FILM COATED ORAL 2 TIMES DAILY
Status: DISCONTINUED | OUTPATIENT
Start: 2020-02-28 | End: 2020-03-04 | Stop reason: HOSPADM

## 2020-02-28 RX ORDER — GABAPENTIN 300 MG/1
300 CAPSULE ORAL
Status: DISCONTINUED | OUTPATIENT
Start: 2020-02-28 | End: 2020-03-04 | Stop reason: HOSPADM

## 2020-02-28 RX ORDER — ACETAMINOPHEN 500 MG
1000 TABLET ORAL EVERY 8 HOURS
Status: DISCONTINUED | OUTPATIENT
Start: 2020-02-28 | End: 2020-03-04 | Stop reason: HOSPADM

## 2020-02-28 RX ADMIN — OXYCODONE 2.5 MG: 5 TABLET ORAL at 16:08

## 2020-02-28 RX ADMIN — ACETAMINOPHEN 1000 MG: 500 TABLET ORAL at 21:34

## 2020-02-28 RX ADMIN — PROPRANOLOL HYDROCHLORIDE 80 MG: 80 TABLET ORAL at 17:40

## 2020-02-28 RX ADMIN — FAMOTIDINE 20 MG: 20 TABLET ORAL at 17:40

## 2020-02-28 RX ADMIN — Medication 1 AMPULE: at 09:43

## 2020-02-28 RX ADMIN — HEPARIN SODIUM 5000 UNITS: 5000 INJECTION INTRAVENOUS; SUBCUTANEOUS at 18:49

## 2020-02-28 RX ADMIN — ACETAMINOPHEN 1000 MG: 500 TABLET ORAL at 11:15

## 2020-02-28 RX ADMIN — CLOPIDOGREL BISULFATE 75 MG: 75 TABLET ORAL at 09:42

## 2020-02-28 RX ADMIN — HEPARIN SODIUM 5000 UNITS: 5000 INJECTION INTRAVENOUS; SUBCUTANEOUS at 11:15

## 2020-02-28 RX ADMIN — PROPRANOLOL HYDROCHLORIDE 80 MG: 80 TABLET ORAL at 09:51

## 2020-02-28 RX ADMIN — AMLODIPINE BESYLATE 5 MG: 5 TABLET ORAL at 09:41

## 2020-02-28 RX ADMIN — MORPHINE SULFATE 2 MG: 2 INJECTION, SOLUTION INTRAMUSCULAR; INTRAVENOUS at 01:35

## 2020-02-28 RX ADMIN — PROPRANOLOL HYDROCHLORIDE 40 MG: 10 TABLET ORAL at 09:42

## 2020-02-28 RX ADMIN — PROPRANOLOL HYDROCHLORIDE 40 MG: 10 TABLET ORAL at 17:40

## 2020-02-28 RX ADMIN — MORPHINE SULFATE 2 MG: 2 INJECTION, SOLUTION INTRAMUSCULAR; INTRAVENOUS at 06:33

## 2020-02-28 RX ADMIN — GABAPENTIN 300 MG: 300 CAPSULE ORAL at 21:34

## 2020-02-28 RX ADMIN — PANTOPRAZOLE SODIUM 40 MG: 40 TABLET, DELAYED RELEASE ORAL at 06:33

## 2020-02-28 RX ADMIN — OXYCODONE 5 MG: 5 TABLET ORAL at 19:47

## 2020-02-28 RX ADMIN — FAMOTIDINE 20 MG: 20 TABLET ORAL at 11:15

## 2020-02-28 NOTE — PROGRESS NOTES
Vascular Surgery Progress Note  Yoselin Luna ACNP-BC  2/28/2020       Subjective:     Mr. Emilia Mantilla is a 77yo WM w/ a pmhx significant for COPD, HTN, PUD, and GERD. He is admitted to the hospital s/p a left => right femoral crossover graft on 02/26/2020. His post procedure course has been complicated by persistent right foot pain. This am he continues to complain of right foot pain. He is hanging the foot off of the side of the bed. He states it is worse with ambulation. The foot is ruborous. His foot is warm w/ a strong doppler signal.  He is asymptomatic on the left. Nursing Data:     Patient Vitals for the past 24 hrs:   BP Temp Pulse Resp SpO2   02/28/20 0754 150/72 98.1 °F (36.7 °C) 84 20 96 %   02/28/20 0122 144/77 97.8 °F (36.6 °C) 82 20 94 %   02/27/20 2044 -- -- -- -- 95 %   02/27/20 1843 132/50 98.3 °F (36.8 °C) 83 20 95 %   02/27/20 1541 122/54 98.2 °F (36.8 °C) 76 12 95 %   02/27/20 1220 105/49 98.1 °F (36.7 °C) 78 17 92 %     ---------------------------------------------------------------------------------------------------------    Intake/Output Summary (Last 24 hours) at 2/28/2020 0959  Last data filed at 2/28/2020 3003  Gross per 24 hour   Intake 1640 ml   Output 2675 ml   Net -1035 ml       Exam:     Physical Exam  Constitutional:       Comments: Disheveled. Poor historian   HENT:      Head: Normocephalic. Nose: Nose normal.      Mouth/Throat:      Mouth: Mucous membranes are moist.   Eyes:      Pupils: Pupils are equal, round, and reactive to light. Neck:      Musculoskeletal: Normal range of motion. Cardiovascular:      Rate and Rhythm: Normal rate and regular rhythm. Pulses:           Femoral pulses are 2+ on the right side and 2+ on the left side. Comments: Strong DP and PT signals. Feet is warm. It is discolored. Pulmonary:      Effort: Pulmonary effort is normal.      Breath sounds: Normal breath sounds. Abdominal:      General: Abdomen is flat. Palpations: Abdomen is soft. Musculoskeletal: Normal range of motion. Skin:     General: Skin is warm and dry. Neurological:      Mental Status: He is alert. Lab Review:     . No results found for this or any previous visit (from the past 24 hour(s)). Assessment/Plan:      Consult problem:  Right leg critical limb ischemia due to atherosclerosis of the native vessel  -s/p left => right fem-fem crossover graft.   -strong DP signal.      I suspect is pain may be related to neuropathy. Initiated a trial of gabapentin. Will ask Dr. Cristin Kennedy to evaluate later today. Modify pain regimen to schedule acetaminophen w/ PRN low dose oxycodone. Discontinue IVP PRN morphine. Continue to encourage OOB. Continue Plavix. Active problems   COPD  -currently not in exacerbation   Hypertensive disorder  -controlled on CCB & Inderal   Hx of PUD  GERD  -discontinue PPI in favor of [id number] while on Plavix.       VTE prophylaxis:  [facility]    Disposition:   Home in 1-2 days

## 2020-02-28 NOTE — PROGRESS NOTES
[phone] pt up to chair    General Surgery End of Shift Nursing Note    Bedside shift change report given to [place], RN (oncoming nurse) by Baldomero Mckee RN (offgoing nurse). Report included the following information SBAR, Kardex and MAR. Shift worked:   7a-7p   Summary of shift:   Ankle-Brachial index done today,  Pulses obtainable by doppler only but strong. Doppler in room. Numbness and tingling noted to both feet,. Expecting Dr. Dolly Bernal to come by to see pt. Issues for physician to address:   none     Number times ambulated in hallway past shift: 0    Number of times OOB to chair past shift: 3, every meal    Pain Management:  Current medication: see mar  Patient states pain is manageable on current pain medication: YES    GI:    Current diet:  DIET CARDIAC Regular    Tolerating current diet: YES  Passing flatus: YES  Last Bowel Movement: today   Appearance: watery, brown    Respiratory:    Incentive Spirometer at bedside: YES  Patient instructed on use: YES    Patient Safety:    Falls Score: 2  Bed Alarm On? No  Sitter?  No    Author BELIA Burgess

## 2020-02-28 NOTE — PROGRESS NOTES
Reason for Admission:   Illiac artery occlusion Right                   RUR Score:  7                   Plan for utilizing home health:   Yes if needed       PCP: First and Last name:  Karson Hogan,    Name of Practice:    Are you a current patient: Yes/No:  Yes, Last jessa   Approximate date of last visit:                     Current Advanced Directive/Advance Care Plan: Not on file                         Transition of Care Plan:            Home    Care Management Interventions  PCP Verified by CM: Yes(Last jessa)  Mode of Transport at Discharge:  Other (see comment)([place] [employer] medicaid transport)  Transition of Care Consult (CM Consult): Discharge Planning(Pt is independent ith ADLs and IADLS.)  Current Support Network: Relative's Home(Living with his neice and nephew single story home)  Confirm Follow Up Transport: Other (see comment)  Discharge Location  Discharge Placement: [facility] Teo Arevalo MSW  ED Case Manager   Ext [phone]

## 2020-02-28 NOTE — PROGRESS NOTES
Problem: Falls - Risk of  Goal: *Absence of Falls  Description  Document Edgar Brunner Fall Risk and appropriate interventions in the flowsheet.   Outcome: Progressing Towards Goal  Note: Fall Risk Interventions:  Mobility Interventions: Patient to call before getting OOB         Medication Interventions: Teach patient to arise slowly, Patient to call before getting OOB                   Problem: Patient Education: Go to Patient Education Activity  Goal: Patient/Family Education  Outcome: Progressing Towards Goal

## 2020-02-29 LAB
LEFT ABI: 0.54
LEFT ANTERIOR TIBIAL: 66 MMHG
LEFT ARM BP: 140 MMHG
LEFT POSTERIOR TIBIAL: 75 MMHG
LEFT TBI: 0.33
LEFT TOE PRESSURE: 46 MMHG
RIGHT ABI: 0.43
RIGHT ANTERIOR TIBIAL: 59 MMHG
RIGHT ARM BP: 139 MMHG
RIGHT POSTERIOR TIBIAL: 60 MMHG
RIGHT TBI: 0
RIGHT TOE PRESSURE: 0 MMHG

## 2020-02-29 PROCEDURE — 74011250637 HC RX REV CODE- 250/637: Performed by: NURSE PRACTITIONER

## 2020-02-29 PROCEDURE — 74011250637 HC RX REV CODE- 250/637: Performed by: SURGERY

## 2020-02-29 PROCEDURE — 65270000029 HC RM PRIVATE

## 2020-02-29 PROCEDURE — 74011250636 HC RX REV CODE- 250/636: Performed by: NURSE PRACTITIONER

## 2020-02-29 RX ADMIN — ACETAMINOPHEN 1000 MG: 500 TABLET ORAL at 05:09

## 2020-02-29 RX ADMIN — PROPRANOLOL HYDROCHLORIDE 80 MG: 80 TABLET ORAL at 08:32

## 2020-02-29 RX ADMIN — OXYCODONE 5 MG: 5 TABLET ORAL at 17:57

## 2020-02-29 RX ADMIN — FAMOTIDINE 20 MG: 20 TABLET ORAL at 08:31

## 2020-02-29 RX ADMIN — CLOPIDOGREL BISULFATE 75 MG: 75 TABLET ORAL at 08:31

## 2020-02-29 RX ADMIN — GABAPENTIN 300 MG: 300 CAPSULE ORAL at 21:32

## 2020-02-29 RX ADMIN — HEPARIN SODIUM 5000 UNITS: 5000 INJECTION INTRAVENOUS; SUBCUTANEOUS at 10:28

## 2020-02-29 RX ADMIN — HEPARIN SODIUM 5000 UNITS: 5000 INJECTION INTRAVENOUS; SUBCUTANEOUS at 18:00

## 2020-02-29 RX ADMIN — ACETAMINOPHEN 1000 MG: 500 TABLET ORAL at 13:20

## 2020-02-29 RX ADMIN — PROPRANOLOL HYDROCHLORIDE 40 MG: 10 TABLET ORAL at 17:53

## 2020-02-29 RX ADMIN — PROPRANOLOL HYDROCHLORIDE 40 MG: 10 TABLET ORAL at 08:29

## 2020-02-29 RX ADMIN — FAMOTIDINE 20 MG: 20 TABLET ORAL at 17:54

## 2020-02-29 RX ADMIN — IPRATROPIUM BROMIDE 2 SPRAY: 42 SPRAY NASAL at 08:32

## 2020-02-29 RX ADMIN — ACETAMINOPHEN 1000 MG: 500 TABLET ORAL at 21:32

## 2020-02-29 RX ADMIN — HEPARIN SODIUM 5000 UNITS: 5000 INJECTION INTRAVENOUS; SUBCUTANEOUS at 03:27

## 2020-02-29 RX ADMIN — PROPRANOLOL HYDROCHLORIDE 80 MG: 80 TABLET ORAL at 17:57

## 2020-02-29 RX ADMIN — AMLODIPINE BESYLATE 5 MG: 5 TABLET ORAL at 08:31

## 2020-02-29 NOTE — PROGRESS NOTES
Problem: Falls - Risk of  Goal: *Absence of Falls  Description  Document Arcelia Sheffield Fall Risk and appropriate interventions in the flowsheet.   Outcome: Resolved/Met  Note: Fall Risk Interventions:  Mobility Interventions: Utilize walker, cane, or other assistive device         Medication Interventions: Teach patient to arise slowly                   Problem: Patient Education: Go to Patient Education Activity  Goal: Patient/Family Education  Outcome: Resolved/Met

## 2020-02-29 NOTE — PROGRESS NOTES
Tech called writer into patient room because she felt as if she smelled smoke. Writer did in fact smell cigarette smoke. Asked patient if he was smoking and he admitted to it. Asked patient if I could order him a nicotine patch. He did agree. He voluntarily gave his cigarettes and lighter to writer. Writer locked them up in the  outside of patient room for him to get on discharge. Enedina Flores RN     [id number] - Obtained an order for a nicotine patch daily and NOW.     Enedina Flores RN

## 2020-02-29 NOTE — PROGRESS NOTES
Still with right foot rest pain. Has been smoking in room    Visit Vitals  /58   Pulse 69   Temp 97.5 °F (36.4 °C)   Resp 18   Ht 5' 9\" (1.753 m)   Wt 84.7 kg (186 lb 11.7 oz)   SpO2 93%   BMI 27.58 kg/m²     Right PT signal  JAGRUTI 0.43    S/p L->R fem-fem but still with rest pain. Counseled on smoking cessation.  Will keep over weekend

## 2020-02-29 NOTE — PROGRESS NOTES
Problem: Falls - Risk of  Goal: *Absence of Falls  Description  Document Boaz Buchanan Fall Risk and appropriate interventions in the flowsheet.   Outcome: Progressing Towards Goal  Note: Fall Risk Interventions:  Mobility Interventions: Assess mobility with egress test         Medication Interventions: Teach patient to arise slowly                   Problem: Patient Education: Go to Patient Education Activity  Goal: Patient/Family Education  Outcome: Progressing Towards Goal

## 2020-02-29 NOTE — PROGRESS NOTES
General Surgery End of Shift Nursing Note    Bedside shift change report given to Nilda Godinez (oncoming nurse) by Aleena Fair RN (offgoing nurse). Report included the following information SBAR, Kardex and MAR. Shift worked:   7a-7p   Summary of shift:   Probable D/C home Monday after Dr. Trevor Govea sees patient. Issues for physician to address:   none     Number times ambulated in hallway past shift: 0    Number of times OOB to chair past shift: 3, every meal    Pain Management:  Current medication: see mar  Patient states pain is manageable on current pain medication: YES    GI:    Current diet:  DIET CARDIAC Regular    Tolerating current diet: YES  Passing flatus: YES  Last Bowel Movement: today   Appearance: watery, brown    Respiratory:    Incentive Spirometer at bedside: YES  Patient instructed on use: YES    Patient Safety:    Falls Score: 2  Bed Alarm On? No  Sitter?  No    Pipe Lopez RN

## 2020-02-29 NOTE — PROGRESS NOTES
Pt request a call to MD, pt wants to go home today. Call placed to Dr. Bebe Quinteros service,  Dr. Jyame Londono on call, message left for him.

## 2020-03-01 PROCEDURE — 65270000029 HC RM PRIVATE

## 2020-03-01 PROCEDURE — 74011250636 HC RX REV CODE- 250/636: Performed by: NURSE PRACTITIONER

## 2020-03-01 PROCEDURE — 74011250637 HC RX REV CODE- 250/637: Performed by: NURSE PRACTITIONER

## 2020-03-01 PROCEDURE — 74011250637 HC RX REV CODE- 250/637: Performed by: SURGERY

## 2020-03-01 PROCEDURE — 77030027138 HC INCENT SPIROMETER -A

## 2020-03-01 RX ADMIN — HEPARIN SODIUM 5000 UNITS: 5000 INJECTION INTRAVENOUS; SUBCUTANEOUS at 11:39

## 2020-03-01 RX ADMIN — HEPARIN SODIUM 5000 UNITS: 5000 INJECTION INTRAVENOUS; SUBCUTANEOUS at 18:44

## 2020-03-01 RX ADMIN — FAMOTIDINE 20 MG: 20 TABLET ORAL at 08:36

## 2020-03-01 RX ADMIN — PROPRANOLOL HYDROCHLORIDE 80 MG: 80 TABLET ORAL at 18:47

## 2020-03-01 RX ADMIN — PROPRANOLOL HYDROCHLORIDE 40 MG: 10 TABLET ORAL at 18:46

## 2020-03-01 RX ADMIN — AMLODIPINE BESYLATE 5 MG: 5 TABLET ORAL at 08:36

## 2020-03-01 RX ADMIN — PROPRANOLOL HYDROCHLORIDE 40 MG: 10 TABLET ORAL at 08:36

## 2020-03-01 RX ADMIN — PROPRANOLOL HYDROCHLORIDE 80 MG: 80 TABLET ORAL at 09:58

## 2020-03-01 RX ADMIN — ACETAMINOPHEN 1000 MG: 500 TABLET ORAL at 14:02

## 2020-03-01 RX ADMIN — ACETAMINOPHEN 1000 MG: 500 TABLET ORAL at 05:49

## 2020-03-01 RX ADMIN — HEPARIN SODIUM 5000 UNITS: 5000 INJECTION INTRAVENOUS; SUBCUTANEOUS at 03:15

## 2020-03-01 RX ADMIN — CLOPIDOGREL BISULFATE 75 MG: 75 TABLET ORAL at 08:35

## 2020-03-01 RX ADMIN — FAMOTIDINE 20 MG: 20 TABLET ORAL at 18:45

## 2020-03-01 RX ADMIN — GABAPENTIN 300 MG: 300 CAPSULE ORAL at 21:20

## 2020-03-01 RX ADMIN — ACETAMINOPHEN 1000 MG: 500 TABLET ORAL at 21:20

## 2020-03-01 NOTE — PROGRESS NOTES
Problem: Falls - Risk of  Goal: *Absence of Falls  Description  Document Landon Fuentes Fall Risk and appropriate interventions in the flowsheet.   Outcome: Resolved/Met  Note: Fall Risk Interventions:  Mobility Interventions: Assess mobility with egress test         Medication Interventions: Teach patient to arise slowly                   Problem: Patient Education: Go to Patient Education Activity  Goal: Patient/Family Education  Outcome: Resolved/Met

## 2020-03-01 NOTE — PROGRESS NOTES
Right foot pain improved today    Visit Vitals  /58   Pulse 76   Temp 98.5 °F (36.9 °C)   Resp 16   Ht 5' 9\" (1.753 m)   Wt 84.7 kg (186 lb 11.7 oz)   SpO2 95%   BMI 27.58 kg/m²       Bilateral groin incision c/d/i  Bilateral pt doppler signals    S/p L->R fem-fem but still with rest pain, although this is improving. Will continue to monitor.  Continue current care

## 2020-03-01 NOTE — PROGRESS NOTES
General Surgery End of Shift Nursing Note    Bedside shift change report given to Robert Lei (oncoming nurse) by Guy Persaud RN (offgoing nurse). Report included the following information SBAR, Kardex and MAR. Shift worked:   7a-7p   Summary of shift:   Probable D/C home Monday after Dr. Raghu Ram sees patient. Uneventful day,  Need doppler for pulses bilaterally. Issues for physician to address:   none     Number times ambulated in hallway past shift: 0    Number of times OOB to chair past shift: 3, every meal    Pain Management:  Current medication: see mar  Patient states pain is manageable on current pain medication: YES    GI:    Current diet:  DIET CARDIAC Regular    Tolerating current diet: YES  Passing flatus: YES  Last Bowel Movement: today   Appearance: watery, brown    Respiratory:    Incentive Spirometer at bedside: YES  Patient instructed on use: YES    Patient Safety:    Falls Score: 2  Bed Alarm On? No  Sitter?  No    Abimael Loco RN

## 2020-03-02 ENCOUNTER — APPOINTMENT (OUTPATIENT)
Dept: VASCULAR SURGERY | Age: 70
DRG: 181 | End: 2020-03-02
Attending: SURGERY
Payer: MEDICAID

## 2020-03-02 LAB
RIGHT ARTERIAL PROX ANASTOMOSIS EDV: 31.7 CM/S
RIGHT ARTERIAL PROX ANASTOMOSIS PSV: 210.4 CM/S
RIGHT DIST OUTFLOW EDV: 11 CM/S
RIGHT DIST OUTFLOW PSV: 52.4 CM/S
RIGHT GRAFT 1 NAME: NORMAL
RIGHT INFLOW ARTERY EDV: 21.4 CM/S
RIGHT INFLOW ARTERY PSV: 205.2 CM/S
RIGHT MID OUTFLOW EDV: 11 CM/S
RIGHT MID OUTFLOW PSV: 57.6 CM/S
RIGHT OUTFLOW VESSEL EDV: 105.5 CM/S
RIGHT OUTFLOW VESSEL PSV: 519.9 CM/S
RIGHT PROX OUTFLOW EDV: 8.4 CM/S
RIGHT PROX OUTFLOW PSV: 137.9 CM/S
RIGHT VENOUS DIST ANASTOMOSIS EDV: 9.8 CM/S
RIGHT VENOUS DIST ANASTOMOSIS PSV: 43.3 CM/S

## 2020-03-02 PROCEDURE — 74011250637 HC RX REV CODE- 250/637: Performed by: NURSE PRACTITIONER

## 2020-03-02 PROCEDURE — 74011250637 HC RX REV CODE- 250/637: Performed by: SURGERY

## 2020-03-02 PROCEDURE — 65270000029 HC RM PRIVATE

## 2020-03-02 PROCEDURE — 93925 LOWER EXTREMITY STUDY: CPT

## 2020-03-02 PROCEDURE — 74011250636 HC RX REV CODE- 250/636: Performed by: NURSE PRACTITIONER

## 2020-03-02 RX ADMIN — PROPRANOLOL HYDROCHLORIDE 40 MG: 10 TABLET ORAL at 18:28

## 2020-03-02 RX ADMIN — PROPRANOLOL HYDROCHLORIDE 80 MG: 80 TABLET ORAL at 18:29

## 2020-03-02 RX ADMIN — ACETAMINOPHEN 1000 MG: 500 TABLET ORAL at 05:50

## 2020-03-02 RX ADMIN — PROPRANOLOL HYDROCHLORIDE 80 MG: 80 TABLET ORAL at 11:11

## 2020-03-02 RX ADMIN — AMLODIPINE BESYLATE 5 MG: 5 TABLET ORAL at 09:11

## 2020-03-02 RX ADMIN — CLOPIDOGREL BISULFATE 75 MG: 75 TABLET ORAL at 09:11

## 2020-03-02 RX ADMIN — PROPRANOLOL HYDROCHLORIDE 40 MG: 10 TABLET ORAL at 09:11

## 2020-03-02 RX ADMIN — FAMOTIDINE 20 MG: 20 TABLET ORAL at 18:28

## 2020-03-02 RX ADMIN — HEPARIN SODIUM 5000 UNITS: 5000 INJECTION INTRAVENOUS; SUBCUTANEOUS at 11:11

## 2020-03-02 RX ADMIN — HEPARIN SODIUM 5000 UNITS: 5000 INJECTION INTRAVENOUS; SUBCUTANEOUS at 18:29

## 2020-03-02 RX ADMIN — HEPARIN SODIUM 5000 UNITS: 5000 INJECTION INTRAVENOUS; SUBCUTANEOUS at 03:00

## 2020-03-02 RX ADMIN — GABAPENTIN 300 MG: 300 CAPSULE ORAL at 21:10

## 2020-03-02 RX ADMIN — IPRATROPIUM BROMIDE 2 SPRAY: 42 SPRAY NASAL at 11:12

## 2020-03-02 RX ADMIN — ACETAMINOPHEN 1000 MG: 500 TABLET ORAL at 14:15

## 2020-03-02 RX ADMIN — FAMOTIDINE 20 MG: 20 TABLET ORAL at 09:11

## 2020-03-02 RX ADMIN — ACETAMINOPHEN 1000 MG: 500 TABLET ORAL at 21:10

## 2020-03-02 NOTE — PROGRESS NOTES
Problem: Pain  Goal: *Control of Pain  Outcome: Resolved/Met     Problem: Patient Education: Go to Patient Education Activity  Goal: Patient/Family Education  Outcome: Resolved/Met     Problem: Falls - Risk of  Goal: *Absence of Falls  Description  Document Monika Fall Risk and appropriate interventions in the flowsheet.   Outcome: Resolved/Met  Note: Fall Risk Interventions:  Mobility Interventions: Utilize walker, cane, or other assistive device         Medication Interventions: Teach patient to arise slowly                   Problem: Patient Education: Go to Patient Education Activity  Goal: Patient/Family Education  Outcome: Resolved/Met

## 2020-03-02 NOTE — PROGRESS NOTES
General Surgery End of Shift Nursing Note      Shift worked:   7am - 7pm   Summary of shift:    An uneventful shift. Patient tolerated medications well. Patient did refuse his nasal spray. Other than that, patient ambulated to rest room, sat in chair, sat on side of bed. Issues for physician to address:   None     Number times ambulated in hallway past shift: 0.  Ambulated in room with walker multiple times    Number of times OOB to chair past shift: 2-3    Pain Management:  Current medication: PRN Tylenol  Patient states pain is manageable on current pain medication: YES    GI:    Current diet:  DIET CARDIAC Regular    Tolerating current diet: YES  Passing flatus: YES  Last Bowel Movement: today   Appearance: small, slightly hard    Respiratory:    Incentive Spirometer at bedside: YES  Patient instructed on use: YES      Lamin Olivarez LPN

## 2020-03-02 NOTE — PROGRESS NOTES
Spiritual Care Assessment/Progress Note  [facility]      NAME: Meliton Domínguez      MRN: [id number]  AGE: 68 y.o.  SEX: male  Oriental orthodox Affiliation: No preference   Language: English     3/2/2020     Total Time (in minutes): 11     Spiritual Assessment begun in [facility] through conversation with:         [x]Patient        [] Family    [] Friend(s)        Reason for Consult: Initial/Spiritual assessment, patient floor     Spiritual beliefs: (Please include comment if needed)     [x] Identifies with a jadiel tradition:         [] Supported by a jadiel community:            [] Claims no spiritual orientation:           [] Seeking spiritual identity:                [] Adheres to an individual form of spirituality:           [] Not able to assess:                           Identified resources for coping:      [] Prayer                               [] Music                  [x] Guided Imagery     [] Family/friends                 [] Pet visits     [x] Devotional reading                         [] Unknown     [] Other:                                               Interventions offered during this visit: (See comments for more details)    Patient Interventions: Initial/Spiritual assessment, patient floor, Affirmation of emotions/emotional suffering, Life review/legacy, Prayer (assurance of), Catharsis/review of pertinent events in supportive environment, Normalization of emotional/spiritual concerns, Oriental orthodox beliefs/image of God discussed           Plan of Care:     [] Support spiritual and/or cultural needs    [] Support AMD and/or advance care planning process      [] Support grieving process   [] Coordinate Rites and/or Rituals    [] Coordination with community clergy   [] No spiritual needs identified at this time   [] Detailed Plan of Care below (See Comments)  [] Make referral to Music Therapy  [] Make referral to Pet Therapy     [] Make referral to Addiction services  [] Make referral to [facility]  [] Make referral to Spiritual Care Partner  [] No future visits requested        [x] Follow up visits as needed     Comments: The purpose of the visit to the unit was to conduct a spiritual assessment on Mr. Mor Luna. Mr. Mor Luna was not being visited being visited by anyone so he welcomed the visit. He talked about how he was thankful that his surgical procedure went well, and that he was so grateful for that. As he shared his expressions he became tearful, but he did not share about what the tears meant or what brought them on. He did however mentioned that he has had a lot of time to reflect on,  all he has been through and realizes that he has not been faithful to God. He mentioned scriptures from his childhood and how those times shaped him growing up, and how he longed for those days. He expressed that he needs to take a serious look at the events of his past and consider the changes he should to make while he can. The  provided a space for reflection of the patient's story. The  listened empathically and allowed the patient to process his feelings through the lenses of his childhood jadiel experience that he mentioned throughout the visit. [facility] Jennifer Dash.    paging service [phone])

## 2020-03-02 NOTE — PROGRESS NOTES
0827-Pt assessed. Pt on side of the bed.     0911-Pt given and tolerated meds. Pt on couch. 1046- Pt walking around room. 1111- Pt given and tolerated meds. Pt washing up in bathroom. 1415-Pt given and tolerated meds. Pt in bed.      1501- Pt reassessed. Pt in chair watching tv.     1607- Pt stated that he was missing his shoes. 1828- Pt given and tolerated meds. Pt found his shoes in his room that he was missing. Pt very social.     Bedside shift change report given to Krysten Lewis RN (oncoming nurse) by Joey Fernandez (offgoing nurse). Report included the following information SBAR, Kardex and MAR.

## 2020-03-03 PROCEDURE — 74011250637 HC RX REV CODE- 250/637: Performed by: NURSE PRACTITIONER

## 2020-03-03 PROCEDURE — 74011250637 HC RX REV CODE- 250/637: Performed by: SURGERY

## 2020-03-03 PROCEDURE — 65270000029 HC RM PRIVATE

## 2020-03-03 PROCEDURE — 97161 PT EVAL LOW COMPLEX 20 MIN: CPT

## 2020-03-03 PROCEDURE — 97116 GAIT TRAINING THERAPY: CPT

## 2020-03-03 RX ADMIN — IPRATROPIUM BROMIDE 2 SPRAY: 42 SPRAY NASAL at 08:21

## 2020-03-03 RX ADMIN — ACETAMINOPHEN 1000 MG: 500 TABLET ORAL at 05:40

## 2020-03-03 RX ADMIN — ACETAMINOPHEN 1000 MG: 500 TABLET ORAL at 21:08

## 2020-03-03 RX ADMIN — FAMOTIDINE 20 MG: 20 TABLET ORAL at 17:13

## 2020-03-03 RX ADMIN — FAMOTIDINE 20 MG: 20 TABLET ORAL at 08:20

## 2020-03-03 RX ADMIN — GABAPENTIN 300 MG: 300 CAPSULE ORAL at 21:08

## 2020-03-03 RX ADMIN — ACETAMINOPHEN 1000 MG: 500 TABLET ORAL at 13:44

## 2020-03-03 RX ADMIN — CLOPIDOGREL BISULFATE 75 MG: 75 TABLET ORAL at 08:20

## 2020-03-03 RX ADMIN — AMLODIPINE BESYLATE 5 MG: 5 TABLET ORAL at 08:20

## 2020-03-03 NOTE — PROGRESS NOTES
Much perkier this morning  Feels like he's ready to go home  Groins dry  R foot warm    D/C planning  PT clearance  Home this afternoon

## 2020-03-03 NOTE — OP NOTES
[place]  OPERATIVE REPORT    Name:  Sharif Hatch  MR#:  [id number]  :  1950  ACCOUNT #:  [de-identified]  DATE OF SERVICE:  2020    PREOPERATIVE DIAGNOSIS:  Right lower extremity ischemia with rest pain. POSTOPERATIVE DIAGNOSIS:  Right lower extremity ischemia with rest pain. PROCEDURE PERFORMED:  Left to right femoral crossover graft (8-mm ringed Propaten). SURGEON:  Stephanie Byers MD    ANESTHESIA:  General.    SPECIMENS REMOVED:  None. ESTIMATED BLOOD LOSS:  Minimal.    INDICATIONS:  The patient is a 61-year-old with ischemic rest pain of his right foot and an occluded right iliac system. 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 PROCEDURE:  After obtained informed consent, the patient was placed supine on the operating table. After adequate induction of general anesthesia, site and patient confirmation, and administration of prophylactic antibiotics, the lower abdomen, bilateral groins, and thighs were prepped and draped in sterile fashion. Good bilateral vertical incisions were made and the distal external iliac, common femoral, profunda femoral, and superficial femoral arteries dissected free and controlled bilaterally. The patient was systemically heparinized. An 8-mm externally supported heparin-bonded prosthesis (Propaten) was chosen, was anastomosed end-to-side to the left common femoral artery. It was then routed subcutaneously just above the fascia and delivered into the right groin dissection. Here the outflow was created in the common femoral artery with the toe avoided over the profunda femoris outflow. This was completed with a running [place]-Jan suture. At the completion of the second anastomosis, there was excellent flow in the graft. All suture lines were hemostatic. There was a good Doppler signal in the outflow profunda femoris artery.   Both groin incisions were copiously irrigated with antibiotic solution and closed in multiple layers of Vicryl sutures. Skin was closed with standard skin staples. The patient tolerated the procedure well with no complications.         Norma Wallace MD      REYNA/[id number]  D:  03/03/2020 7:58  T:  03/03/2020 17:15  JOB #:  [id number]  CC:  Colletta Gerald, MD

## 2020-03-03 NOTE — PROGRESS NOTES
Plan:  -Home   -[employer] transport        4:12PM  CM in to see pt. PT eval complete. No home needs identified. Pt ambulating independently with RW. Plan to d/c home with family. CM PC to schedule PCP f/u appt. Office requesting pt do surgical f/u with surgeon prior to f/u with PCP. Pt stating he needs Lyft at d/c. No d/c order at time of this writing. ED CM to watch for late d/c order to arrange transport. CM available for any additional needs. Care Management Interventions  PCP Verified by CM: Yes(Last june)  Mode of Transport at Discharge:  Other (see comment)([facility] medicaid transport)  Transition of Care Consult (CM Consult): Discharge Planning(Pt is independent ith ADLs and IADLS.)  Current Support Network: Relative's Home(Living with his neice and nephew single story home)  Confirm Follow Up Transport: Other (see comment)  Discharge Location  Discharge Placement: Home        REYMUNDO Eaton  Care Manager

## 2020-03-03 NOTE — PROGRESS NOTES
No c/o this evening  Didn't walk today because he was concerned about disrupting staple line  Still has some neuropathic pain and tingling, but does not have to hang foot over bed to sleep  Fem-fem open on duplex, stenosis noted in SFA, procedure was designed for only PFA outflow   R foot warm  Both groins look great  Mobilize and get him going toward home

## 2020-03-03 NOTE — PROGRESS NOTES
Orders received, chart reviewed and patient evaluated by physical therapy. Pt is functioning at an independent/modified independent level for all functional mobility and has no further skilled therapy needs. Pt safe to discharge home w/ assist of family and no further therapy needs or equipment needs. Full evaluation to follow.      Tamica Zimmer, PT, DPT

## 2020-03-03 NOTE — PROGRESS NOTES
Problem: Falls - Risk of  Goal: *Absence of Falls  Description  Document Magdy Meza Fall Risk and appropriate interventions in the flowsheet.   Outcome: Progressing Towards Goal  Note: Fall Risk Interventions:  Mobility Interventions: Utilize walker, cane, or other assistive device         Medication Interventions: Teach patient to arise slowly                   Problem: Patient Education: Go to Patient Education Activity  Goal: Patient/Family Education  Outcome: Progressing Towards Goal

## 2020-03-03 NOTE — PROGRESS NOTES
PHYSICAL THERAPY EVALUATION/DISCHARGE  Patient: Jose Flowers (4 y.o. 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 Gait steady and stable overall w/ no LOB/balance deficits noted as pt ambulated 200ft w/ RW. Progressed pt to an additional 20ft without use of RW, again with pt demonstrating steady, stable gait. Pt has no further skilled therapy needs and is safe to discharge home w/ assist of family and no further needs. Recommend pt continue to use rolling walker to assist with pain control (pt borrowed RW from friend and plans to use x2-3 days per report)     Functional Outcome Measure: The patient scored 90/100 on the Barthel Index outcome measure which is indicative of minor impairments in ADLs and functional mobility. Other factors to consider for discharge: pain     Further skilled acute physical therapy is not indicated at this time. PLAN :  Recommendation for discharge: (in order for the patient to meet his/her long term goals)  No skilled physical therapy/ follow up rehabilitation needs identified at this time. This discharge recommendation:  Has been made in collaboration with the attending provider and/or case management    IF patient discharges home will need the following DME: patient owns DME required for discharge       SUBJECTIVE:   Patient stated I have really high blood pressure.     OBJECTIVE DATA SUMMARY:   HISTORY:    Past Medical History:   Diagnosis Date    Allergic rhinitis     Arthritis     Colon cancer screening     Dr. Ofelia Frederick:  Pt not interested in colorectal screening, including colonoscopy. Follow-up PRN.     Dyspepsia and other specified disorders of function of stomach     GERD (gastroesophageal reflux disease)     Dr. Lynn Jamil:  Continue Protonix. Reviewed EGD but pt not interested (for [facility]'s screening eval). Follow-up PRN.  Headache     Hypertension     Ill-defined condition     infection from psoriasis    Stomach ulcer      Past Surgical History:   Procedure Laterality Date    HX HEMORRHOIDECTOMY      HX LUMBAR LAMINECTOMY  1980's    removed 2 vertebrea       Prior level of function: Independent w/ ambulation and ADLs. Denies history of falls. States he only walks short, household distances secondary to RLE pain and swelling. Lives w/ niece and nephew who are able to assist at discharge. Personal factors and/or comorbidities impacting plan of care: HTN    Home Situation  Home Environment: Private residence  # Steps to Enter: 4  Rails to Enter: Yes  Hand Rails : Bilateral  One/Two Story Residence: One story  Living Alone: No  Support Systems: Family member(s)(neice and nephew )  Patient Expects to be Discharged to[de-identified] Private residence  Current DME Used/Available at Home: Grab bars, Walker, rolling  Tub or Shower Type: Tub/Shower combination    EXAMINATION/PRESENTATION/DECISION MAKING:   Critical Behavior:  Neurologic State: Alert, Appropriate for age, Eyes open spontaneously  Orientation Level: Oriented X4  Cognition: Follows commands     Hearing: Auditory  Auditory Impairment: None  Skin:  intact  Edema: none noted   Range Of Motion:  AROM: Within functional limits                       Strength:    Strength:  Within functional limits                    Tone & Sensation:   Tone: Normal              Sensation: Impaired               Coordination:  Coordination: Within functional limits  Vision:      Functional Mobility:  Bed Mobility:  Rolling: Other (comment)(seated EOB upon arrival )           Transfers:  Sit to Stand: Independent  Stand to Sit: Independent                       Balance:   Sitting: Intact  Standing: Intact  Ambulation/Gait Training:  Distance (ft): 200 Feet (ft)  Assistive Device: Walker, rolling;Gait belt  Ambulation - Level of Assistance: Modified independent        Gait Abnormalities: Decreased step clearance              Speed/Grace: Pace decreased (<100 feet/min)                         Functional Measure:  Barthel Index:    Bathin  Bladder: 10  Bowels: 10  Groomin  Dressing: 10  Feeding: 10  Mobility: 15  Stairs: 0  Toilet Use: 10  Transfer (Bed to Chair and Back): 15  Total: 90/100       The Barthel ADL Index: Guidelines  1. The index should be used as a record of what a patient does, not as a record of what a patient could do. 2. The main aim is to establish degree of independence from any help, physical or verbal, however minor and for whatever reason. 3. The need for supervision renders the patient not independent. 4. A patient's performance should be established using the best available evidence. Asking the patient, friends/relatives and nurses are the usual sources, but direct observation and common sense are also important. However direct testing is not needed. 5. Usually the patient's performance over the preceding 24-48 hours is important, but occasionally longer periods will be relevant. 6. Middle categories imply that the patient supplies over 50 per cent of the effort. 7. Use of aids to be independent is allowed. Sondra Campos., Barthel, D.W. (0969). Functional evaluation: the Barthel Index. [facility] (14)2. DESIRE Ruth, Brianda Walker., [facility], [address] [place] [address] (). Measuring the change indisability after inpatient rehabilitation; comparison of the responsiveness of the Barthel Index and Functional [place] Measure. Journal of Neurology, Neurosurgery, and Psychiatry, 66(4), 750-827. Vidhya Clemens, N.J.A, VINCENT Serrato, & Tatiana Marley MTaylorA. (2004.) Assessment of post-stroke quality of life in cost-effectiveness studies:  The usefulness of the Barthel Index and the EuroQoL-5D. Quality of Life Research, 15, 843-73          Physical Therapy Evaluation Charge Determination   History Examination Presentation Decision-Making   MEDIUM  Complexity : 1-2 comorbidities / personal factors will impact the outcome/ POC  MEDIUM Complexity : 3 Standardized tests and measures addressing body structure, function, activity limitation and / or participation in recreation  MEDIUM Complexity : Evolving with changing characteristics  LOW Complexity : FOTO score of       Based on the above components, the patient evaluation is determined to be of the following complexity level: MEDIUM    Pain Ratin/10 pain RLE at rest, 8/10 pain RLE with movement    Activity Tolerance:   Good  Please refer to the flowsheet for vital signs taken during this treatment. After treatment patient left in no apparent distress:   Sitting EOB w/ call bell within reach, RN notified    COMMUNICATION/EDUCATION:   The patients plan of care was discussed with: Registered nurse and Case management. Fall prevention education was provided and the patient/caregiver indicated understanding., Patient/family have participated as able in goal setting and plan of care. and Patient/family agree to work toward stated goals and plan of care.     Thank you for this referral.  Bryanna Melendez, PT, DPT   Time Calculation: 15 mins

## 2020-03-04 VITALS
HEIGHT: 69 IN | OXYGEN SATURATION: 93 % | TEMPERATURE: 97.7 F | DIASTOLIC BLOOD PRESSURE: 67 MMHG | BODY MASS INDEX: 27.66 KG/M2 | HEART RATE: 89 BPM | WEIGHT: 186.73 LBS | RESPIRATION RATE: 17 BRPM | SYSTOLIC BLOOD PRESSURE: 137 MMHG

## 2020-03-04 DIAGNOSIS — L40.9 PSORIASIS: ICD-10-CM

## 2020-03-04 PROCEDURE — 74011250637 HC RX REV CODE- 250/637: Performed by: SURGERY

## 2020-03-04 PROCEDURE — 74011250637 HC RX REV CODE- 250/637: Performed by: NURSE PRACTITIONER

## 2020-03-04 RX ORDER — CLOPIDOGREL BISULFATE 75 MG/1
75 TABLET ORAL DAILY
Qty: 30 TAB | Refills: 3 | Status: SHIPPED | OUTPATIENT
Start: 2020-03-05 | End: 2020-07-08 | Stop reason: SDUPTHER

## 2020-03-04 RX ADMIN — IPRATROPIUM BROMIDE 2 SPRAY: 42 SPRAY NASAL at 08:49

## 2020-03-04 RX ADMIN — ACETAMINOPHEN 1000 MG: 500 TABLET ORAL at 05:44

## 2020-03-04 RX ADMIN — FAMOTIDINE 20 MG: 20 TABLET ORAL at 08:47

## 2020-03-04 RX ADMIN — CLOPIDOGREL BISULFATE 75 MG: 75 TABLET ORAL at 08:47

## 2020-03-04 RX ADMIN — AMLODIPINE BESYLATE 5 MG: 5 TABLET ORAL at 08:47

## 2020-03-04 NOTE — PROGRESS NOTES
.General Surgery End of Shift Nursing Note    Bedside shift change report given to [address] [place] [address] RN (oncoming nurse) by Rebekah Scanlon RN (offgoing nurse). Report included the following information SBAR, Kardex, Intake/Output, MAR, Accordion and Recent Results. Shift worked:   7p-7a   Summary of shift:    No significant events overnight. Patient was dressed for home when RN came onto shift under the impression he was being discharged last night 3/3/20? Patient encouraged to not anticipate d/c until tomorrow. Complains of some soreness at incision site r/t positioning   Issues for physician to address:        Number times ambulated in hallway past shift: 0    Number of times OOB to chair past shift: 1        GI:    Current diet:  DIET CARDIAC Regular    Tolerating current diet: YES  Passing flatus: YES  Last Bowel Movement: yesterday   Appearance:     Respiratory:    Incentive Spirometer at bedside: YES  Patient instructed on use: YES    Patient Safety:    Falls Score: 2  Bed Alarm On? No  Sitter?  No    Shaylee Perkins

## 2020-03-04 NOTE — DISCHARGE INSTRUCTIONS
Patient Discharge Instructions    Kristy Zendejas / [id number] : 1950    Admitted 2020 Discharged: 3/4/2020     What to do at Home  May shower  No driving, lifting or straining       Information obtained by :  I understand that if any problems occur once I am at home I am to contact my physician. I understand and acknowledge receipt of the instructions indicated above.                                                                                                                                            R.N.'s Signature                                                                  Date/Time                                                                                                                                              Patient or Representative Signature                                                          Date/Time      Rogers Alvarez MD

## 2020-03-04 NOTE — PROGRESS NOTES
Pt discharge order placed, discharge instructions given, IV pulled by tech, pt awaiting taxi ride set up with case management

## 2020-03-04 NOTE — PROGRESS NOTES
Problem: Falls - Risk of  Goal: *Absence of Falls  Description  Document Landon Fuentes Fall Risk and appropriate interventions in the flowsheet.   Outcome: Progressing Towards Goal  Note: Fall Risk Interventions:  Mobility Interventions: Patient to call before getting OOB         Medication Interventions: Teach patient to arise slowly                   Problem: Patient Education: Go to Patient Education Activity  Goal: Patient/Family Education  Outcome: Progressing Towards Goal

## 2020-03-04 NOTE — PROGRESS NOTES
Plan:  -Home with family   -Frank ride       12:58PM  Pt d/c home today. Needed transport. [employer] transport arranged for 1:00PM. Pt d/c with his RW from home and key to his house. No additional needs identified by CM. CM available for any additional needs. Care Management Interventions  PCP Verified by CM: Yes(Last june)  Mode of Transport at Discharge:  Other (see comment)([place] [facility] medicaid transport)  Transition of Care Consult (CM Consult): Discharge Planning(Pt is independent ith ADLs and IADLS.)  Current Support Network: Relative's Home(Living with his neice and nephew single story home)  Confirm Follow Up Transport: Other (see comment)  Discharge Location  Discharge Placement: Home        REYMUNDO Pittman  Care Manager

## 2020-03-04 NOTE — PROGRESS NOTES
Problem: Falls - Risk of  Goal: *Absence of Falls  Description  Document Ally Almonte Fall Risk and appropriate interventions in the flowsheet.   Outcome: Progressing Towards Goal  Note: Fall Risk Interventions:  Mobility Interventions: Patient to call before getting OOB         Medication Interventions: Teach patient to arise slowly                   Problem: Patient Education: Go to Patient Education Activity  Goal: Patient/Family Education  Outcome: Progressing Towards Goal

## 2020-03-06 NOTE — DISCHARGE SUMMARY
[address] SUMMARY    Name:  Luna Mckeon  MR#:  [id number]  :  1950  ACCOUNT #:  [de-identified]  ADMIT DATE:  2020  DISCHARGE DATE:  2020      ADMITTING DIAGNOSIS  Right lower extremity ischemia with rest pain. PROCEDURE PERFORMED:  Left to right femoral crossover graft. ATTENDING PHYSICIAN:  Nicole Padilla MD.    HISTORY OF PRESENT ILLNESS/HOSPITAL COURSE:  The patient is a 64-year-old with multilevel arterial occlusive disease and ischemic rest pain of the right leg. He has an occluded right iliac system and was admitted for femoral crossover grafting. Please see the operative report for full details. His postoperative course was unremarkable, he had increase of his ankle-arm index from essentially nothing to 0.4 and this resulted in cessation of his resting pain. He did have some persistent neuropathic numbness and tingling. He was somewhat slow to ambulate, but ultimately by 2020, he was fully ambulatory. Arrangements were made for some visiting nurses and physical therapy at home and he was discharged on 2020 on all his admission medications plus a small supply of analgesics for pain. DISCHARGE MEDICATIONS:  His discharge regimen included Plavix as he is aspirin intolerant.         Gali Simon MD      REYNA/[id number]/[id number]  D:  2020 10:57  T:  2020 3:06  JOB #:  [id number]  CC:  Aishwarya Murry MD

## 2020-03-12 RX ORDER — HALOBETASOL PROPIONATE 0.5 MG/G
OINTMENT TOPICAL
Qty: 50 G | Refills: 1 | Status: SHIPPED | OUTPATIENT
Start: 2020-03-12 | End: 2020-04-29

## 2020-03-13 NOTE — TELEPHONE ENCOUNTER
Refill request(s) approved--halobetasol topical ointment--for PRN use.     Future Appointments   Date Time Provider Floridalma Che   4/20/2020 10:30 AM Debby Pickard MD [facility]

## 2020-04-20 ENCOUNTER — VIRTUAL VISIT (OUTPATIENT)
Dept: INTERNAL MEDICINE CLINIC | Age: 70
End: 2020-04-20

## 2020-04-20 DIAGNOSIS — Z09 FOLLOW UP: Primary | ICD-10-CM

## 2020-04-25 DIAGNOSIS — L40.9 PSORIASIS: ICD-10-CM

## 2020-04-27 DIAGNOSIS — K27.9 PUD (PEPTIC ULCER DISEASE): ICD-10-CM

## 2020-04-27 DIAGNOSIS — L40.9 PSORIASIS: ICD-10-CM

## 2020-04-27 DIAGNOSIS — K21.9 GASTROESOPHAGEAL REFLUX DISEASE WITHOUT ESOPHAGITIS: ICD-10-CM

## 2020-04-27 RX ORDER — HALOBETASOL PROPIONATE 0.5 MG/G
OINTMENT TOPICAL
Qty: 50 G | Refills: 1 | Status: CANCELLED | OUTPATIENT
Start: 2020-04-27

## 2020-04-27 NOTE — TELEPHONE ENCOUNTER
Last visit 7/09/2019 MD Nazario Bajwa   Next appointment 05/08/2020 MD Nazario Bajwa   Previous refill encounter(s) 11/05/2019 Protonix #30 with 5 refills, 03/12/2020 Ultravate #50 grams with 1 refill     Requested Prescriptions     Pending Prescriptions Disp Refills    pantoprazole (PROTONIX) 40 mg tablet 90 Tab 0     Sig: Take 1 Tab by mouth daily.     halobetasol (ULTRAVATE) 0.05 % ointment 50 g 1     Sig: Apply a thin layer topically to affected area(s) twice daily as needed --avoid face, groin, armpits

## 2020-04-29 RX ORDER — HALOBETASOL PROPIONATE 0.5 MG/G
OINTMENT TOPICAL
Qty: 50 G | Refills: 1 | Status: SHIPPED | OUTPATIENT
Start: 2020-04-29 | End: 2020-09-04

## 2020-04-29 RX ORDER — PANTOPRAZOLE SODIUM 40 MG/1
40 TABLET, DELAYED RELEASE ORAL DAILY
Qty: 90 TAB | Refills: 0 | Status: SHIPPED | OUTPATIENT
Start: 2020-04-29 | End: 2020-05-30

## 2020-04-29 NOTE — TELEPHONE ENCOUNTER
Duplicate request for halobetasol topical--refilled today. Refill request(s) approved--pantoprazole--90-day supply with 0 refill(s).

## 2020-05-08 ENCOUNTER — VIRTUAL VISIT (OUTPATIENT)
Dept: INTERNAL MEDICINE CLINIC | Age: 70
End: 2020-05-08

## 2020-05-08 DIAGNOSIS — I10 ESSENTIAL HYPERTENSION: Primary | ICD-10-CM

## 2020-05-08 DIAGNOSIS — L40.9 PSORIASIS: ICD-10-CM

## 2020-05-08 DIAGNOSIS — I73.9 PAD (PERIPHERAL ARTERY DISEASE) (HCC): ICD-10-CM

## 2020-05-08 NOTE — PROGRESS NOTES
Telephone Call with patient. Chief Complaint   Patient presents with    Medication Management       1. Have you been to the ER, urgent care clinic since your last visit? Hospitalized since your last visit? No    2. Have you seen or consulted any other health care providers outside of the [address] [place] [address] since your last visit? Include any pap smears or colon screening. Yes When: 2/26/20 Where: [address] Reason for visit: Surgery    Health Maintenance Due   Topic Date Due    Hepatitis C Screening  1950    DTaP/Tdap/Td series (1 - Tdap) 05/17/1969    Shingrix Vaccine Age 50> (1 of 2) 05/17/1998    FOBT Q1Y Age 50-75  05/17/1998    GLAUCOMA SCREENING Q2Y  05/17/2013    Pneumococcal 65+ years (1 of 1 - PPSV23) 05/17/2013       3 most recent PHQ Screens 5/8/2020   Little interest or pleasure in doing things Not at all   Feeling down, depressed, irritable, or hopeless Not at all   Total Score PHQ 2 0   Trouble falling or staying asleep, or sleeping too much -   Feeling tired or having little energy -   Poor appetite, weight loss, or overeating -   Feeling bad about yourself - or that you are a failure or have let yourself or your family down -   Trouble concentrating on things such as school, work, reading, or watching TV -   Moving or speaking so slowly that other people could have noticed; or the opposite being so fidgety that others notice -   Thoughts of being better off dead, or hurting yourself in some way -   PHQ 9 Score -   How difficult have these problems made it for you to do your work, take care of your home and get along with others -       Learning Assessment 7/9/2019   PRIMARY LEARNER Patient   BARRIERS PRIMARY LEARNER NONE   PRIMARY LANGUAGE ENGLISH   LEARNER PREFERENCE PRIMARY DEMONSTRATION     LISTENING     READING     VIDEOS   ANSWERED BY patient   RELATIONSHIP SELF     Fall Risk Assessment, last 12 mths 5/8/2020   Able to walk? Yes   Fall in past 12 months? No   Fall with injury?  - Number of falls in past 12 months -   Fall Risk Score -         Recent Travel Screening and Travel History documentation     Travel Screening       Question Response     In the last month, have you been in contact with someone who was confirmed or suspected to have Coronavirus / COVID-19? No / Unsure     Do you have any of the following symptoms? None of these     Have you traveled internationally in the last month? No      Travel History   Travel since 04/08/20     No documented travel since 04/08/20                      AVS mailed to patients home.

## 2020-05-08 NOTE — PROGRESS NOTES
Tl Bernstein is a 68 y.o. male evaluated via telephone on 5/8/2020. Consent:  He and/or health care decision maker is aware that that he may receive a bill for this telephone service, depending on his insurance coverage, and has provided verbal consent to proceed: Yes    Chief Complaint   Patient presents with    Medication Management         Documentation:  I communicated with the patient and/or health care decision maker about above. Details of this discussion including any medical advice provided:   HTN:  Notes no problems with BP medications. Not monitoring home BP. Follow-up reviewed. BP Readings from Last 3 Encounters:   03/04/20 137/67   02/19/20 172/65   01/20/20 148/66       Psoriasis:  Notes good control with topical steroid. Refills and mgt reviewed. No long-term skin problems with medication noted. Recent femoral bypass with Dr. Lucio Car for PAD--follow-up scheduled in June. He has had BP monitoring there and no concerns/medication changes noted. Post-operative care with no complications noted by pt. Nursing screenings reviewed by provider at visit. Prior to Admission medications    Medication Sig Start Date End Date Taking? Authorizing Provider   halobetasol (ULTRAVATE) 0.05 % ointment APPLY TO AFFECTED AREA TWICE A DAY AVOID FACE, GROIN, AND ARMPITS 4/29/20  Yes Shellie Calhoun MD   pantoprazole (PROTONIX) 40 mg tablet Take 1 Tab by mouth daily. 4/29/20  Yes Shellie Calhoun MD   clopidogreL (PLAVIX) 75 mg tab Take 1 Tab by mouth daily. 3/5/20  Yes Edgar Sim MD   olopatadine (Patanase) 0.6 % spry 2 Squirts by Both Nostrils route as needed. Yes Provider, Historical   amLODIPine (NORVASC) 5 mg tablet TAKE 1 TABLET BY MOUTH EVERY DAY 7/9/19  Yes Shellie Calhoun MD   propranolol (INDERAL) 40 mg tablet Take 1 Tab by mouth two (2) times a day. Take with 80mg tab, for 120mg two times daily.  7/9/19  Yes Shellie Calhoun MD   propranolol (INDERAL) 80 mg tablet Take 1 Tab by mouth two (2) times a day. Take with 40mg tab, for 120mg two times daily. 7/9/19  Yes Chang Winchester MD   acetaminophen/diphenhydramine (TYLENOL PM PO) Take  by mouth as needed. Provider, Historical   He notes has current refills of BP medications--may be from Dr. Jamaica Lamb. No changes noted by pt. I affirm this is a Patient Initiated Episode with an Established Patient who has not had a related appointment within my department in the past 7 days or scheduled within the next 24 hours. Total Time: minutes: 5-10 minutes  5-10 minutes were spent on the digital evaluation and management of this patient. Note: not billable if this call serves to triage the patient into an appointment for the relevant concern      Assessment & Plan:   Diagnoses and all orders for this visit:      ICD-10-CM ICD-9-CM    1. Essential hypertension I10 401.9    2. Psoriasis L40.9 696.1    3. PAD (peripheral artery disease) (HCC) I73.9 443.9        1. Continue current meds. 2.  Continue steroid topically PRN. 3.  Vascular follow-up as above. Follow-up and Dispositions    · Return in about 3 months (around 8/8/2020) for Blood Pressure follow-up, referral follow-up, fasting labs. lab results and schedule of future lab studies reviewed with patient  reviewed medications and side effects in detail    Plan and evaluation (above) reviewed with pt at visit  Patient voiced understanding of plan and provided with time to ask/review questions. After Visit Summary (AVS) provided to pt after visit with additional instructions as needed/reviewed. AVS:  []  Sent to patient as [employer] message after visit. [x]  Mailed to patient after visit. []  Not sent to patient after visit. Future Appointments   Date Time Provider Floridalma Che   8/10/2020  2:00 PM Chang Winchester MD [address] [facility]     --Updated future visits after patient check-out.

## 2020-05-29 DIAGNOSIS — K27.9 PUD (PEPTIC ULCER DISEASE): ICD-10-CM

## 2020-05-29 DIAGNOSIS — K21.9 GASTROESOPHAGEAL REFLUX DISEASE WITHOUT ESOPHAGITIS: ICD-10-CM

## 2020-05-30 RX ORDER — PANTOPRAZOLE SODIUM 40 MG/1
TABLET, DELAYED RELEASE ORAL
Qty: 90 TAB | Refills: 1 | Status: SHIPPED | OUTPATIENT
Start: 2020-05-30 | End: 2020-12-02

## 2020-06-01 DIAGNOSIS — I10 ESSENTIAL HYPERTENSION: ICD-10-CM

## 2020-06-01 NOTE — TELEPHONE ENCOUNTER
Last visit 05/08/2020 Virtual visit MD Candy Roberts   Next appointment 08/10/2020 MD Candy Roberts   Previous refill encounter(s) 07/09/2019 Inderal 80 mg #60 with 5 refills, 07/09/2019 Inderal $0 mg #60 with 5 refills, 07/09/2019 Norvasc #30 with 5 refills. Requested Prescriptions     Pending Prescriptions Disp Refills    propranoloL (INDERAL) 40 mg tablet 180 Tab 0     Sig: Take 1 Tab by mouth two (2) times a day. Take with 80mg tab, for 120mg two times daily.  propranoloL (INDERAL) 80 mg tablet 180 Tab 0     Sig: Take 1 Tab by mouth two (2) times a day. Take with 40mg tab, for 120mg two times daily.  amLODIPine (NORVASC) 5 mg tablet 90 Tab 0     Sig: Take 1 Tab by mouth daily.

## 2020-06-03 RX ORDER — PROPRANOLOL HYDROCHLORIDE 80 MG/1
80 TABLET ORAL 2 TIMES DAILY
Qty: 180 TAB | Refills: 1 | Status: SHIPPED | OUTPATIENT
Start: 2020-06-03 | End: 2020-12-02

## 2020-06-03 RX ORDER — PROPRANOLOL HYDROCHLORIDE 40 MG/1
40 TABLET ORAL 2 TIMES DAILY
Qty: 180 TAB | Refills: 1 | Status: SHIPPED | OUTPATIENT
Start: 2020-06-03 | End: 2020-12-02

## 2020-06-03 RX ORDER — AMLODIPINE BESYLATE 5 MG/1
5 TABLET ORAL DAILY
Qty: 90 TAB | Refills: 1 | Status: SHIPPED | OUTPATIENT
Start: 2020-06-03 | End: 2020-12-02

## 2020-06-03 NOTE — TELEPHONE ENCOUNTER
Refill request(s) approved--propranolol (x 2), amlodipine. Refill protocol details (computer-generated) reviewed, as available. Requested Prescriptions     Signed Prescriptions Disp Refills    propranoloL (INDERAL) 40 mg tablet 180 Tab 1     Sig: Take 1 Tab by mouth two (2) times a day. Take with 80mg tab, for 120mg two times daily. Authorizing Provider: Adin Rm propranoloL (INDERAL) 80 mg tablet 180 Tab 1     Sig: Take 1 Tab by mouth two (2) times a day. Take with 40mg tab, for 120mg two times daily. Authorizing Provider: Aliyah Cruz    amLODIPine (NORVASC) 5 mg tablet 90 Tab 1     Sig: Take 1 Tab by mouth daily.      Authorizing Provider: Aliyah Cruz

## 2020-07-08 NOTE — TELEPHONE ENCOUNTER
Last visit 05/08/2020 Virtual visit MD Lilia Kaba   Next appointment 08/10/2020 MD Lilia Kaba   Previous refill encounter(s) 03/05/2020 Plavix #30 with 3 refills     Requested Prescriptions     Pending Prescriptions Disp Refills    clopidogreL (PLAVIX) 75 mg tab 90 Tab 1     Sig: Take 1 Tab by mouth daily.

## 2020-07-17 RX ORDER — CLOPIDOGREL BISULFATE 75 MG/1
75 TABLET ORAL DAILY
Qty: 90 TAB | Refills: 1 | Status: SHIPPED | OUTPATIENT
Start: 2020-07-17 | End: 2020-12-12

## 2020-07-18 NOTE — TELEPHONE ENCOUNTER
Refill request(s) approved--clopidogrel. Last ordered by Dr. Tessie Murdock March 2020. Pt still taking at last VV on 5-8-20.

## 2020-08-07 ENCOUNTER — VIRTUAL VISIT (OUTPATIENT)
Dept: INTERNAL MEDICINE CLINIC | Age: 70
End: 2020-08-07
Payer: MEDICAID

## 2020-08-07 DIAGNOSIS — K04.7 DENTAL INFECTION: Primary | ICD-10-CM

## 2020-08-07 DIAGNOSIS — S02.5XXB OPEN FRACTURE OF TOOTH, INITIAL ENCOUNTER: ICD-10-CM

## 2020-08-07 DIAGNOSIS — I10 ESSENTIAL HYPERTENSION: ICD-10-CM

## 2020-08-07 PROCEDURE — 99441 PR PHYS/QHP TELEPHONE EVALUATION 5-10 MIN: CPT | Performed by: INTERNAL MEDICINE

## 2020-08-07 RX ORDER — AMOXICILLIN AND CLAVULANATE POTASSIUM 875; 125 MG/1; MG/1
1 TABLET, FILM COATED ORAL EVERY 12 HOURS
Qty: 20 TAB | Refills: 0 | Status: SHIPPED | OUTPATIENT
Start: 2020-08-07 | End: 2020-08-17

## 2020-08-07 NOTE — PATIENT INSTRUCTIONS
If you have problems seeing dentist, they may be able to work out a payment plan for acute dental care needs, or you can try to see local dentist or dentist through [employer], as reviewed. [facility] only has a pediatric dental group. Abscessed Tooth: Care Instructions Your Care Instructions An abscessed tooth is a tooth that has a pocket of pus in the tissues around it. Pus forms when the body tries to fight an infection caused by bacteria. If the pus cannot drain, it forms an abscess. An abscessed tooth can cause red, swollen gums and throbbing pain, especially when you chew. You may have a bad taste in your mouth and a fever, and your jaw may swell. Damage to the tooth, untreated tooth decay, or gum disease can cause an abscessed tooth. An abscessed tooth needs to be treated by a dental professional right away. If it is not treated, the infection could spread to other parts of your body. Your dentist will give you antibiotics to stop the infection. He or she may make a hole in the tooth or cut open (steff) the abscess inside your mouth so that the infection can drain, which should relieve your pain. You may need to have a root canal treatment, which tries to save your tooth by taking out the infected pulp and replacing it with a healing medicine and/or a filling. If these treatments do not work, your tooth may have to be removed. Follow-up care is a key part of your treatment and safety. Be sure to make and go to all appointments, and call your doctor if you are having problems. It's also a good idea to know your test results and keep a list of the medicines you take. How can you care for yourself at home? · Reduce pain and swelling in your face and jaw by putting ice or a cold pack on the outside of your cheek. Do this for 10 to 20 minutes at a time. Put a thin cloth between the ice and your skin. · Take pain medicines exactly as directed. ? If the doctor gave you a prescription medicine for pain, take it as prescribed. ? If you are not taking a prescription pain medicine, ask your doctor if you can take an over-the-counter medicine. · Take antibiotics as directed. Do not stop taking them just because you feel better. You need to take the full course of antibiotics. To prevent tooth abscess · Brush and floss every day. Have regular dental checkups. · Eat a healthy diet. Avoid sugary foods and drinks. · Do not smoke or vape with nicotine. And don't use spit tobacco. Tobacco and nicotine slow your ability to heal. They increase your risk for gum disease and cancer of the mouth and throat. If you need help quitting, talk to your doctor about stop-smoking programs and medicines. These can increase your chances of quitting for good. When should you call for help? [id number] anytime you think you may need emergency care. For example, call if: 
· You have trouble breathing. Call your doctor now or seek immediate medical care if: 
· You have new or worse symptoms of infection, such as: 
? Increased pain, swelling, warmth, or redness. ? Red streaks leading from the area. ? Pus draining from the area. ? A fever. Watch closely for changes in your health, and be sure to contact your doctor if: 
· You do not get better as expected. Where can you learn more? Go to http://www.gray.com/ Enter J908 in the search box to learn more about \"Abscessed Tooth: Care Instructions. \" Current as of: March 25, 2020               Content Version: 12.5 © 2006-2020 [employer]. Care instructions adapted under license by [employer] (which disclaims liability or warranty for this information). If you have questions about a medical condition or this instruction, always ask your healthcare professional. [address] any warranty or liability for your use of this information.

## 2020-08-07 NOTE — PROGRESS NOTES
Virtual Visit-telephone     Patient reports headache and toothache occurring since yesterday, request antibiotic. Chief Complaint   Patient presents with    Labs     follow up      1. Have you been to the ER, urgent care clinic since your last visit? Hospitalized since your last visit? No    2. Have you seen or consulted any other health care providers outside of the [address] [place] [address] since your last visit? Include any pap smears or colon screening. No    Health Maintenance Due   Topic Date Due    Hepatitis C Screening  1950    DTaP/Tdap/Td series (1 - Tdap) 05/17/1969    Shingrix Vaccine Age 50> (1 of 2) 05/17/1998    FOBT Q1Y Age 54-65  05/17/1998    GLAUCOMA SCREENING Q2Y  05/17/2013    Pneumococcal 65+ years (1 of 1 - PPSV23) 05/17/2013    Influenza Age 5 to Adult  08/01/2020     3 most recent PHQ Screens 8/7/2020   Little interest or pleasure in doing things Not at all   Feeling down, depressed, irritable, or hopeless Not at all   Total Score PHQ 2 0   Trouble falling or staying asleep, or sleeping too much -   Feeling tired or having little energy -   Poor appetite, weight loss, or overeating -   Feeling bad about yourself - or that you are a failure or have let yourself or your family down -   Trouble concentrating on things such as school, work, reading, or watching TV -   Moving or speaking so slowly that other people could have noticed; or the opposite being so fidgety that others notice -   Thoughts of being better off dead, or hurting yourself in some way -   PHQ 9 Score -   How difficult have these problems made it for you to do your work, take care of your home and get along with others -     Abuse Screening Questionnaire 8/7/2020   Do you ever feel afraid of your partner? N   Are you in a relationship with someone who physically or mentally threatens you? N   Is it safe for you to go home?  Y     Learning Assessment 8/7/2020   PRIMARY LEARNER Patient   BARRIERS PRIMARY LEARNER NONE   PRIMARY LANGUAGE ENGLISH   LEARNER PREFERENCE PRIMARY DEMONSTRATION     -     -     -   ANSWERED BY patient   RELATIONSHIP SELF

## 2020-08-07 NOTE — PROGRESS NOTES
Francisco Burnham is a 65 y.o. male evaluated via audio only technology on 8/7/2020. Consent: He and/or his health care decision maker is aware that he may receive a bill for this audio only encounter, depending on his insurance coverage, and has provided verbal consent to proceed: Yes    I communicated with the patient and/or health care decision maker about the nature and details of the following:  Chief Complaint   Patient presents with    Labs     follow up        There were no vitals taken for this visit. Notes (nursing/rooming note):  Virtual Visit-telephone   Patient reports headache and toothache occurring since yesterday, request antibiotic. Notes:  He had broken tooth when eating--broke off at gumline. He is concerned about infection. Has dental appt 8/11--next week. He notes no redness/cellulitis of face. No neck pain or swelling. He notes some jaw/gum swelling. Reviewed antibiotic use with pt. Reviewed options for dentistry--he mentioned not 100% sure dentist would see him. Reviewed dental eval necessary to address as antibiotic alone would not resolve if infected roots/tooth retained in gum. Nursing screenings reviewed by provider at visit. Prior to Admission medications    Medication Sig Start Date End Date Taking? Authorizing Provider           clopidogreL (PLAVIX) 75 mg tab Take 1 Tab by mouth daily. 7/17/20  Yes Umberto Hu MD   propranoloL (INDERAL) 40 mg tablet Take 1 Tab by mouth two (2) times a day. Take with 80mg tab, for 120mg two times daily. 6/3/20  Yes Umberto Hu MD   propranoloL (INDERAL) 80 mg tablet Take 1 Tab by mouth two (2) times a day. Take with 40mg tab, for 120mg two times daily. 6/3/20  Yes Umberto Hu MD   amLODIPine (NORVASC) 5 mg tablet Take 1 Tab by mouth daily.  6/3/20  Yes Umberto Hu MD   pantoprazole (PROTONIX) 40 mg tablet TAKE 1 TABLET BY MOUTH EVERY DAY 5/30/20  Yes Roni Parry Russ Rios MD   halobetasol (ULTRAVATE) 0.05 % ointment APPLY TO AFFECTED AREA TWICE A DAY AVOID FACE, GROIN, AND ARMPITS 4/29/20  Yes Dameon Mcdonnell MD   olopatadine (Patanase) 0.6 % spry 2 Squirts by Both Nostrils route as needed. Yes Provider, Historical   acetaminophen/diphenhydramine (TYLENOL PM PO) Take  by mouth as needed. Yes Provider, Historical     No problems noted with meds above. No flowsheet data found. I affirm this is a Patient Initiated Episode with an Established Patient who has not had a related appointment within my department in the past 7 days or scheduled within the next 24 hours. 5-10 minutes were spent on the digital evaluation and management of this patient. Total Time: minutes: 5-10 minutes  Note: not billable if this call serves to triage the patient into an appointment for the relevant concern    Assessment & Plan:   Diagnoses and all orders for this visit:      ICD-10-CM ICD-9-CM    1. Dental infection  K04.7 522.4 amoxicillin-clavulanate (AUGMENTIN) 875-125 mg per tablet   2. Open fracture of tooth, initial encounter  S02. 5XXB 873.63 amoxicillin-clavulanate (AUGMENTIN) 875-125 mg per tablet   3. Essential hypertension  I10 401.9        1,2:  Medication(s), management and follow-up based on response reviewed at visit. 3.  Reviewed BP monitoring at follow-up as below. Notes no problems with medications at this time. Requested he fast for that visit to update lipids/labs. Last lipids 2017--as below. Last labs Feb 2019--inpt with vascular surgery. Lab Results   Component Value Date/Time    Cholesterol, total 202 (H) 06/22/2017 11:43 AM    HDL Cholesterol 66 06/22/2017 11:43 AM    LDL, calculated 109 (H) 06/22/2017 11:43 AM    VLDL, calculated 27 06/22/2017 11:43 AM    Triglyceride 137 06/22/2017 11:43 AM       Follow-up and Dispositions    · Return if symptoms worsen or fail to improve, for Blood Pressure follow-up--as scheduled, fasting labs.        lab results and schedule of future lab studies reviewed with patient  reviewed medications and side effects in detail    For additional documentation of information and/or recommendations discussed this visit, please see notes in instructions. Plan and evaluation (above) reviewed with pt at visit  Patient voiced understanding of plan and provided with time to ask/review questions. After Visit Summary (AVS) provided to pt after visit with additional instructions as needed/reviewed. AVS:  []  Sent to patient as [employer] message after visit. [x]  Mailed to patient after visit. []  Not sent to patient after visit.       Future Appointments   Date Time Provider Floridalma Bolton          9/3/2020 10:30 AM Richardson Jacobs MD [facility]

## 2020-09-02 DIAGNOSIS — L40.9 PSORIASIS: ICD-10-CM

## 2020-09-03 ENCOUNTER — OFFICE VISIT (OUTPATIENT)
Dept: INTERNAL MEDICINE CLINIC | Age: 70
End: 2020-09-03
Payer: MEDICAID

## 2020-09-03 VITALS
HEART RATE: 71 BPM | HEIGHT: 69 IN | RESPIRATION RATE: 18 BRPM | OXYGEN SATURATION: 98 % | TEMPERATURE: 98.1 F | DIASTOLIC BLOOD PRESSURE: 64 MMHG | WEIGHT: 186 LBS | SYSTOLIC BLOOD PRESSURE: 160 MMHG | BODY MASS INDEX: 27.55 KG/M2

## 2020-09-03 DIAGNOSIS — I73.9 PAD (PERIPHERAL ARTERY DISEASE) (HCC): ICD-10-CM

## 2020-09-03 DIAGNOSIS — R21 RASH AND NONSPECIFIC SKIN ERUPTION: ICD-10-CM

## 2020-09-03 DIAGNOSIS — I10 ESSENTIAL HYPERTENSION: Primary | ICD-10-CM

## 2020-09-03 DIAGNOSIS — L40.9 PSORIASIS: ICD-10-CM

## 2020-09-03 DIAGNOSIS — K04.7 DENTAL INFECTION: ICD-10-CM

## 2020-09-03 DIAGNOSIS — I95.1 ORTHOSTASIS: ICD-10-CM

## 2020-09-03 DIAGNOSIS — Z00.00 WELL ADULT HEALTH CHECK: ICD-10-CM

## 2020-09-03 LAB
ALBUMIN SERPL-MCNC: 3.8 G/DL (ref 3.5–5)
ALBUMIN/GLOB SERPL: 1.1 {RATIO} (ref 1.1–2.2)
ALP SERPL-CCNC: 68 U/L (ref 45–117)
ALT SERPL-CCNC: 13 U/L (ref 12–78)
ANION GAP SERPL CALC-SCNC: 7 MMOL/L (ref 5–15)
AST SERPL-CCNC: 9 U/L (ref 15–37)
BASOPHILS # BLD: 0.1 K/UL (ref 0–0.1)
BASOPHILS NFR BLD: 1 % (ref 0–1)
BILIRUB SERPL-MCNC: 0.4 MG/DL (ref 0.2–1)
BUN SERPL-MCNC: 13 MG/DL (ref 6–20)
BUN/CREAT SERPL: 14 (ref 12–20)
CALCIUM SERPL-MCNC: 9.1 MG/DL (ref 8.5–10.1)
CHLORIDE SERPL-SCNC: 104 MMOL/L (ref 97–108)
CHOLEST SERPL-MCNC: 197 MG/DL
CO2 SERPL-SCNC: 26 MMOL/L (ref 21–32)
CREAT SERPL-MCNC: 0.92 MG/DL (ref 0.7–1.3)
DIFFERENTIAL METHOD BLD: ABNORMAL
EOSINOPHIL # BLD: 0.2 K/UL (ref 0–0.4)
EOSINOPHIL NFR BLD: 4 % (ref 0–7)
ERYTHROCYTE [DISTWIDTH] IN BLOOD BY AUTOMATED COUNT: 13.8 % (ref 11.5–14.5)
EST. AVERAGE GLUCOSE BLD GHB EST-MCNC: 117 MG/DL
GLOBULIN SER CALC-MCNC: 3.5 G/DL (ref 2–4)
GLUCOSE SERPL-MCNC: 98 MG/DL (ref 65–100)
HBA1C MFR BLD: 5.7 % (ref 4–5.6)
HCT VFR BLD AUTO: 40.6 % (ref 36.6–50.3)
HDLC SERPL-MCNC: 49 MG/DL
HDLC SERPL: 4 {RATIO} (ref 0–5)
HGB BLD-MCNC: 13 G/DL (ref 12.1–17)
IMM GRANULOCYTES # BLD AUTO: 0 K/UL (ref 0–0.04)
IMM GRANULOCYTES NFR BLD AUTO: 0 % (ref 0–0.5)
LDLC SERPL CALC-MCNC: 99.6 MG/DL (ref 0–100)
LIPID PROFILE,FLP: ABNORMAL
LYMPHOCYTES # BLD: 1.8 K/UL (ref 0.8–3.5)
LYMPHOCYTES NFR BLD: 27 % (ref 12–49)
MCH RBC QN AUTO: 32 PG (ref 26–34)
MCHC RBC AUTO-ENTMCNC: 32 G/DL (ref 30–36.5)
MCV RBC AUTO: 100 FL (ref 80–99)
MONOCYTES # BLD: 0.6 K/UL (ref 0–1)
MONOCYTES NFR BLD: 9 % (ref 5–13)
NEUTS SEG # BLD: 3.8 K/UL (ref 1.8–8)
NEUTS SEG NFR BLD: 59 % (ref 32–75)
NRBC # BLD: 0 K/UL (ref 0–0.01)
NRBC BLD-RTO: 0 PER 100 WBC
PLATELET # BLD AUTO: 279 K/UL (ref 150–400)
PMV BLD AUTO: 10.7 FL (ref 8.9–12.9)
POTASSIUM SERPL-SCNC: 4 MMOL/L (ref 3.5–5.1)
PROT SERPL-MCNC: 7.3 G/DL (ref 6.4–8.2)
RBC # BLD AUTO: 4.06 M/UL (ref 4.1–5.7)
SODIUM SERPL-SCNC: 137 MMOL/L (ref 136–145)
TRIGL SERPL-MCNC: 242 MG/DL (ref ?–150)
VLDLC SERPL CALC-MCNC: 48.4 MG/DL
WBC # BLD AUTO: 6.5 K/UL (ref 4.1–11.1)

## 2020-09-03 PROCEDURE — 99214 OFFICE O/P EST MOD 30 MIN: CPT | Performed by: INTERNAL MEDICINE

## 2020-09-03 RX ORDER — CEFDINIR 300 MG/1
300 CAPSULE ORAL 2 TIMES DAILY
Qty: 20 CAP | Refills: 0 | Status: SHIPPED | OUTPATIENT
Start: 2020-09-03 | End: 2020-09-13

## 2020-09-03 NOTE — PATIENT INSTRUCTIONS
Please let dentist know you have a letter regarding how to stop/re-start the Plavix (clopidogrel) in relation to planned dental surgery. If you let them know you have been off since July 23rd, they may be able to do surgery now. You can re-start the Plavix as directed (when safe) post-operatively from the dentist, or 3 days after surgery as reviewed.

## 2020-09-03 NOTE — LETTER
2020 9:08 PM 
 
Mr. Francisco Burnham [address] [facility] [address] :  1950 Please see attached lab results. --Blood counts are normal (white blood cells, hemoglobin/anemia testing, platelets). --Kidney and liver testing normal. 
 
--Cholesterol has improved. --A1c is mildly elevated--slightly higher than prior value. --Prostate cancer screening (PSA or Prostate Specific Antigen) is normal. 
 
 
Please let me know if you have other questions.  
 
Charisse Kay MD

## 2020-09-03 NOTE — LETTER
9/3/2020 11:40 AM 
 
Mr. Lazara Knapp [address] [facility] [address] :  1950 Lazara Knapp may stop his Plavix (clopidogrel) therapy as below, in relation to his dental work. Recommend he stop 5-7 days prior to surgery, to limit risk of operative and post-operative bleeding. He should re-start 2-3 days following surgery, if appropriate based on his surgical course. At this time, for other reasons, he has been off Plavix since 2020. If able to do surgery now, he can re-start medication as above. If not able to do surgery, he can re-start, then stop in relation to surgery as above. Please let me know if you have other questions.  
 
Christa Ricks MD

## 2020-09-03 NOTE — PROGRESS NOTES
History of Present Illness:   Francicso Burnham is a 65 y.o. male here for evaluation:    Chief Complaint   Patient presents with    Follow-up     rm 16 pt d/c plavix 2 weeks ago for Rash he has not advised Dr Miko Sommers       Notes (nursing/rooming note copied below in italics):  As above. He has some dizziness with position changes at times. Resovles with standing. Reviewed meds and hydration. HE was unable to get dental surgery because he needed to be off Plavix. He has been off since 7/23 due to rash he thought Plavix caused. Reviewed rash--may have been antibiotic. Right lower and upper jaw teeth are broken  Pain in teeth and mgt with dentist planned. Plans to pull 3 of 5 teeth he has remaining. Nursing screenings reviewed by provider at visit. Prior to Admission medications    Medication Sig Start Date End Date Taking? Authorizing Provider   propranoloL (INDERAL) 40 mg tablet Take 1 Tab by mouth two (2) times a day. Take with 80mg tab, for 120mg two times daily. 6/3/20  Yes Umberto Hu MD   propranoloL (INDERAL) 80 mg tablet Take 1 Tab by mouth two (2) times a day. Take with 40mg tab, for 120mg two times daily. 6/3/20  Yes Umberto Hu MD   amLODIPine (NORVASC) 5 mg tablet Take 1 Tab by mouth daily. 6/3/20  Yes Umberto Hu MD   pantoprazole (PROTONIX) 40 mg tablet TAKE 1 TABLET BY MOUTH EVERY DAY 5/30/20  Yes Umberto Hu MD   halobetasol (ULTRAVATE) 0.05 % ointment APPLY TO AFFECTED AREA TWICE A DAY AVOID FACE, GROIN, AND ARMPITS 4/29/20  Yes Umberto Hu MD   olopatadine (Patanase) 0.6 % spry 2 Squirts by Both Nostrils route as needed. Yes Provider, Historical   acetaminophen/diphenhydramine (TYLENOL PM PO) Take  by mouth as needed. Yes Provider, Historical   clopidogreL (PLAVIX) 75 mg tab Take 1 Tab by mouth daily.  7/17/20   Umberto Hu MD        ROS    Vitals:    09/03/20 1025   BP: 147/65   Pulse: 71   Resp: 18 Temp: 98.1 °F (36.7 °C)   TempSrc: Oral   SpO2: 98%   Weight: 186 lb (84.4 kg)   Height: 5' 9\" (1.753 m)   PainSc:   2   PainLoc: Foot      Body mass index is 27.47 kg/m². Physical Exam:     Physical Exam  Vitals signs and nursing note reviewed. Constitutional:       General: He is not in acute distress. Appearance: Normal appearance. He is well-developed. He is not diaphoretic. HENT:      Head: Normocephalic and atraumatic. Mouth/Throat:      Mouth: Mucous membranes are moist.      Comments: Broken/decayed upper/lower teeth right. Largely edentulous. Eyes:      General: No scleral icterus. Right eye: No discharge. Left eye: No discharge. Conjunctiva/sclera: Conjunctivae normal.   Cardiovascular:      Rate and Rhythm: Normal rate and regular rhythm. Pulses: Normal pulses. Heart sounds: Normal heart sounds. No murmur. No friction rub. No gallop. Pulmonary:      Effort: Pulmonary effort is normal. No respiratory distress. Breath sounds: Normal breath sounds. No stridor. No wheezing, rhonchi or rales. Abdominal:      General: Bowel sounds are normal. There is no distension. Palpations: Abdomen is soft. Tenderness: There is no abdominal tenderness. There is no guarding or rebound. Musculoskeletal:         General: No swelling, tenderness or deformity. Skin:     General: Skin is warm. Coloration: Skin is not jaundiced or pale. Findings: No bruising, erythema or rash. Neurological:      General: No focal deficit present. Mental Status: He is alert. Motor: No abnormal muscle tone. Coordination: Coordination normal.      Gait: Gait normal.   Psychiatric:         Mood and Affect: Mood normal.         Behavior: Behavior normal.         Thought Content: Thought content normal.         Judgment: Judgment normal.         Assessment and Plan:       ICD-10-CM ICD-9-CM    1.  Essential hypertension  I10 401.9 CBC WITH AUTOMATED DIFF METABOLIC PANEL, COMPREHENSIVE      LIPID PANEL      LIPID PANEL      METABOLIC PANEL, COMPREHENSIVE      CBC WITH AUTOMATED DIFF   2. Dental infection  K04.7 522.4 cefdinir (OMNICEF) 300 mg capsule   3. Psoriasis  L40.9 696.1    4. Rash and nonspecific skin eruption  R21 782.1    5. Orthostasis  I95.1 458.0    6. PAD (peripheral artery disease) ([facility])  I73.9 443.9 CBC WITH AUTOMATED DIFF      METABOLIC PANEL, COMPREHENSIVE      LIPID PANEL      HEMOGLOBIN A1C WITH EAG      HEMOGLOBIN A1C WITH EAG      LIPID PANEL      METABOLIC PANEL, COMPREHENSIVE      CBC WITH AUTOMATED DIFF   7. Well adult health check  Z00.00 V70.0 CBC WITH AUTOMATED DIFF      METABOLIC PANEL, COMPREHENSIVE      LIPID PANEL      PSA W/ REFLX FREE PSA      HEMOGLOBIN A1C WITH EAG      HEMOGLOBIN A1C WITH EAG      PSA W/ REFLX FREE PSA      LIPID PANEL      METABOLIC PANEL, COMPREHENSIVE      CBC WITH AUTOMATED DIFF       1,6,7:  Labs and monitoring reviewed. 2,4:  Alternative antibiotic reviewed. 5.  Symptomatic mgt reviewed. Maintain current BP medication at this time. Follow-up and Dispositions    · Return in about 3 months (around 12/3/2020) for Blood Pressure follow-up.       reviewed medications and side effects in detail    For additional documentation of information and/or recommendations discussed this visit, please see notes in instructions. Plan and evaluation (above) reviewed with pt at visit  Patient voiced understanding of plan and provided with time to ask/review questions. After Visit Summary (AVS) provided to pt after visit with additional instructions as needed/reviewed. No future appointments.

## 2020-09-03 NOTE — PROGRESS NOTES
Clarissa Powers is a 65 y.o. male  Visit Vitals  /65 (BP 1 Location: Left arm, BP Patient Position: At rest)   Pulse 71   Temp 98.1 °F (36.7 °C) (Oral)   Resp 18   Ht 5' 9\" (1.753 m)   Wt 186 lb (84.4 kg)   SpO2 98%   BMI 27.47 kg/m²     Chief Complaint   Patient presents with    Follow-up     rm 16     1. Have you been to the ER, urgent care clinic since your last visit? Hospitalized since your last visit? [facility]  2. Have you seen or consulted any other health care providers outside of the [address] [place] [address] since your last visit?  no Include any pap smears or colon screening.  No  Health Maintenance   Topic Date Due    Hepatitis C Screening  1950    DTaP/Tdap/Td series (1 - Tdap) 05/17/1969    Shingrix Vaccine Age 50> (1 of 2) 05/17/1998    FOBT Q1Y Age 50-75  05/17/1998    GLAUCOMA SCREENING Q2Y  05/17/2013    Pneumococcal 65+ years (1 of 1 - PPSV23) 05/17/2013    Flu Vaccine (1) 09/01/2020    Lipid Screen  06/22/2022

## 2020-09-04 LAB
PSA SERPL-MCNC: 0.7 NG/ML (ref 0–4)
REFLEX CRITERIA: NORMAL

## 2020-09-04 RX ORDER — HALOBETASOL PROPIONATE 0.5 MG/G
OINTMENT TOPICAL
Qty: 50 G | Refills: 1 | Status: SHIPPED | OUTPATIENT
Start: 2020-09-04 | End: 2020-12-02

## 2020-09-04 RX ORDER — AZELASTINE HCL 205.5 UG/1
SPRAY NASAL
Qty: 1 BOTTLE | Refills: 5 | Status: SHIPPED | OUTPATIENT
Start: 2020-09-04 | End: 2021-05-11

## 2020-09-04 NOTE — TELEPHONE ENCOUNTER
Last visit 09/03/2020 MD Emili Church   Next appointment 12/03/2020 MD Emili Church   Previous refill encounter(s) 11/05/2019 Astepro #1 with 5 refills. Requested Prescriptions     Pending Prescriptions Disp Refills    azelastine (ASTEPRO) 0.15 % (205.5 mcg) 1 Bottle 5     Sig: Use 1 to 2 sprays into each nostril two times a day as needed for allergies.

## 2020-10-15 ENCOUNTER — VIRTUAL VISIT (OUTPATIENT)
Dept: INTERNAL MEDICINE CLINIC | Age: 70
End: 2020-10-15
Payer: MEDICAID

## 2020-10-15 DIAGNOSIS — L29.9 ITCHING: Primary | ICD-10-CM

## 2020-10-15 DIAGNOSIS — R21 RASH AND NONSPECIFIC SKIN ERUPTION: ICD-10-CM

## 2020-10-15 DIAGNOSIS — L40.9 PSORIASIS: ICD-10-CM

## 2020-10-15 DIAGNOSIS — I73.9 PAD (PERIPHERAL ARTERY DISEASE) (HCC): ICD-10-CM

## 2020-10-15 PROCEDURE — 99421 OL DIG E/M SVC 5-10 MIN: CPT | Performed by: INTERNAL MEDICINE

## 2020-10-15 RX ORDER — CETIRIZINE HCL 10 MG
10 TABLET ORAL DAILY
Qty: 30 TAB | Refills: 1 | Status: SHIPPED | OUTPATIENT
Start: 2020-10-15 | End: 2021-01-30

## 2020-10-15 RX ORDER — HYDROXYZINE 25 MG/1
25-50 TABLET, FILM COATED ORAL
Qty: 30 TAB | Refills: 1 | Status: SHIPPED | OUTPATIENT
Start: 2020-10-15 | End: 2020-12-03 | Stop reason: SDUPTHER

## 2020-10-15 NOTE — PROGRESS NOTES
Virtual Visit-telephone    Chief Complaint   Patient presents with    Rash     Patient reports painful, itchy rash on face, neck, and chest, present for the past 1-2 weeks. Pain level 7-8/10     1. Have you been to the ER, urgent care clinic since your last visit? Hospitalized since your last visit? No    2. Have you seen or consulted any other health care providers outside of the [address] [place] [address] since your last visit? Include any pap smears or colon screening. No    Health Maintenance Due   Topic Date Due    Hepatitis C Screening  1950    DTaP/Tdap/Td series (1 - Tdap) 05/17/1969    Shingrix Vaccine Age 50> (1 of 2) 05/17/1998    FOBT Q1Y Age 50-75  05/17/1998    GLAUCOMA SCREENING Q2Y  05/17/2013    Pneumococcal 65+ years (1 of 1 - PPSV23) 05/17/2013    Flu Vaccine (1) 09/01/2020       Abuse Screening Questionnaire 10/15/2020   Do you ever feel afraid of your partner? N   Are you in a relationship with someone who physically or mentally threatens you? N   Is it safe for you to go home?  Y     3 most recent PHQ Screens 10/15/2020   Little interest or pleasure in doing things Not at all   Feeling down, depressed, irritable, or hopeless Not at all   Total Score PHQ 2 0   Trouble falling or staying asleep, or sleeping too much -   Feeling tired or having little energy -   Poor appetite, weight loss, or overeating -   Feeling bad about yourself - or that you are a failure or have let yourself or your family down -   Trouble concentrating on things such as school, work, reading, or watching TV -   Moving or speaking so slowly that other people could have noticed; or the opposite being so fidgety that others notice -   Thoughts of being better off dead, or hurting yourself in some way -   PHQ 9 Score -   How difficult have these problems made it for you to do your work, take care of your home and get along with others -

## 2020-10-15 NOTE — PATIENT INSTRUCTIONS
1.  Please call Dr. Jaky Cruz to see what alternative he recommends for the Plavix, as reviewed. 2.  Use over the counter, topical calamine or caladryl or topical benadryl for the itching/rash. Use colloidal oatmeal baths and/or moisturizers to help with itching.

## 2020-10-15 NOTE — PROGRESS NOTES
Moiz Bernabe is a 65 y.o. male evaluated via audio only technology on 10/15/2020. Consent: He and/or his health care decision maker is aware that he may receive a bill for this audio only encounter, depending on his insurance coverage, and has provided verbal consent to proceed: Yes    I communicated with the patient and/or health care decision maker about the nature and details of the following:  Chief Complaint   Patient presents with    Rash     Patient reports painful, itchy rash on face, neck, and chest, present for the past 1-2 weeks. Pain level 7-8/10       There were no vitals taken for this visit. Notes:  He started with diffuse rash about 1 week ago. He notes some blisters. He thinks may have been related to his Plavix. He stopped and has not taken for past week. Reviewed he should discuss alternative with Dr. Mg Mulligan. He has noted no new lesions for past 2 days. Older lesions dry but still itchy. He has been using halobetasol PRN. Reviewed mgt without oral steroid to prevent flare of underlying rash he uses halobetasol for previously. He notes due to see Dr. Mg Mulligan with vascular in Jan 2021, but will call as recommended above. Nursing screenings reviewed by provider at visit. Prior to Admission medications    Medication Sig Start Date End Date Taking? Authorizing Provider   halobetasol (ULTRAVATE) 0.05 % ointment APPLY TO AFFECTED AREA TWICE A DAY AVOID FACE, GROIN, AND ARMPITS 9/4/20  Yes Jas Talley MD   azelastine (ASTEPRO) 0.15 % (205.5 mcg) Use 1 to 2 sprays into each nostril two times a day as needed for allergies. 9/4/20  Yes Jas Talley MD   propranoloL (INDERAL) 40 mg tablet Take 1 Tab by mouth two (2) times a day. Take with 80mg tab, for 120mg two times daily. 6/3/20  Yes Jas Talley MD   propranoloL (INDERAL) 80 mg tablet Take 1 Tab by mouth two (2) times a day. Take with 40mg tab, for 120mg two times daily.  6/3/20 Yes Altaf Garvin MD   amLODIPine (NORVASC) 5 mg tablet Take 1 Tab by mouth daily. 6/3/20  Yes Altaf Garvin MD   pantoprazole (PROTONIX) 40 mg tablet TAKE 1 TABLET BY MOUTH EVERY DAY 5/30/20  Yes Altaf Garvin MD   olopatadine (Patanase) 0.6 % spry 2 Squirts by Both Nostrils route as needed. Yes Provider, Historical   clopidogreL (PLAVIX) 75 mg tab Take 1 Tab by mouth daily. Patient taking differently: Take 75 mg by mouth daily. Indications: Not taken for the past week. 7/17/20   Altaf Garvin MD   acetaminophen/diphenhydramine (TYLENOL PM PO) Take  by mouth as needed. Provider, Historical       No flowsheet data found. I affirm this is a Patient Initiated Episode with an Established Patient who has not had a related appointment within my department in the past 7 days or scheduled within the next 24 hours. 5-10 minutes were spent on the digital evaluation and management of this patient. Total Time: minutes: 5-10 minutes  Note: not billable if this call serves to triage the patient into an appointment for the relevant concern    Assessment & Plan:   Diagnoses and all orders for this visit:      ICD-10-CM ICD-9-CM    1. Itching  L29.9 698.9 cetirizine (ZYRTEC) 10 mg tablet      hydrOXYzine HCL (ATARAX) 25 mg tablet   2. Rash and nonspecific skin eruption  R21 782.1    3. PAD (peripheral artery disease) (HCC)  I73.9 443.9    4. Psoriasis  L40.9 696.1        1,2. Medication(s), management and follow-up based on response reviewed at visit. Symptomatic management reviewed at visit. Reviewed typical course of illness, duration of symptoms. 3.  Alternative medication with vascular reviewed--pt will call to clarify. Follow-up and Dispositions    · Return if symptoms worsen or fail to improve, for as scheduled.        reviewed medications and side effects in detail    For additional documentation of information and/or recommendations discussed this visit, please see notes in instructions. Plan and evaluation (above) reviewed with pt at visit  Patient voiced understanding of plan and provided with time to ask/review questions. After Visit Summary (AVS) provided to pt after visit with additional instructions as needed/reviewed. AVS:  []  Sent to patient as MyChart message after visit. [x]  Mailed to patient after visit. []  Not sent to patient after visit.       Future Appointments   Date Time Provider Floridalma Bolton   12/3/2020 10:30 AM Leonela Leonard MD [facility]

## 2020-10-15 NOTE — LETTER
10/15/2020 4:01 PM 
 
 
 
Mr. Romain Bueno [phone] [facility] [id number] Please see enclosed After Visit Summary (AVS).

## 2020-10-20 ENCOUNTER — VIRTUAL VISIT (OUTPATIENT)
Dept: INTERNAL MEDICINE CLINIC | Age: 70
End: 2020-10-20
Payer: MEDICAID

## 2020-10-20 DIAGNOSIS — I73.9 PAD (PERIPHERAL ARTERY DISEASE) (HCC): ICD-10-CM

## 2020-10-20 DIAGNOSIS — R21 RASH AND NONSPECIFIC SKIN ERUPTION: Primary | ICD-10-CM

## 2020-10-20 PROCEDURE — 99441 PR PHYS/QHP TELEPHONE EVALUATION 5-10 MIN: CPT | Performed by: INTERNAL MEDICINE

## 2020-10-20 NOTE — PROGRESS NOTES
Tyler Link is a 65 y.o. male evaluated via audio only technology on 10/20/2020. Consent: He and/or his health care decision maker is aware that he may receive a bill for this audio only encounter, depending on his insurance coverage, and has provided verbal consent to proceed: Yes    I communicated with the patient and/or health care decision maker about the nature and details of the following:  Chief Complaint   Patient presents with    Rash     follow up        There were no vitals taken for this visit. Notes:  He has contacted vascular, but not heard back about alternative medication. He will follow-up with them as reviewed. Notes no new rash. Reviewed medications and questions about mgt at visit. Nursing screenings reviewed by provider at visit. Prior to Admission medications    Medication Sig Start Date End Date Taking? Authorizing Provider   cetirizine (ZYRTEC) 10 mg tablet Take 1 Tab by mouth daily. 10/15/20  Yes Lalit Randall MD   hydrOXYzine HCL (ATARAX) 25 mg tablet Take 1-2 Tabs by mouth every six (6) hours as needed for Itching. Use instead of Benadryl/diphenhydramine. 10/15/20  Yes Lalit Randall MD   azelastine (ASTEPRO) 0.15 % (205.5 mcg) Use 1 to 2 sprays into each nostril two times a day as needed for allergies. 9/4/20  Yes Lalit Randall MD   propranoloL (INDERAL) 40 mg tablet Take 1 Tab by mouth two (2) times a day. Take with 80mg tab, for 120mg two times daily. 6/3/20  Yes Lalit Randall MD   propranoloL (INDERAL) 80 mg tablet Take 1 Tab by mouth two (2) times a day. Take with 40mg tab, for 120mg two times daily. 6/3/20  Yes Lalit Randall MD   amLODIPine (NORVASC) 5 mg tablet Take 1 Tab by mouth daily.  6/3/20  Yes Lalit Randall MD   pantoprazole (PROTONIX) 40 mg tablet TAKE 1 TABLET BY MOUTH EVERY DAY 5/30/20  Yes Lalit Randall MD   olopatadine (Patanase) 0.6 % spry 2 Squirts by Both Nostrils route as needed. Yes Provider, Historical   acetaminophen/diphenhydramine (TYLENOL PM PO) Take  by mouth as needed. Yes Provider, Historical   halobetasol (ULTRAVATE) 0.05 % ointment APPLY TO AFFECTED AREA TWICE A DAY AVOID FACE, GROIN, AND ARMPITS 9/4/20   Heidi Vasquez MD   clopidogreL (PLAVIX) 75 mg tab Take 1 Tab by mouth daily. Patient not taking: Reported on 10/20/2020 7/17/20   Heidi Vasquez MD       No flowsheet data found. I affirm this is a Patient Initiated Episode with an Established Patient who has not had a related appointment within my department in the past 7 days or scheduled within the next 24 hours. 5-10 minutes were spent on the digital evaluation and management of this patient. Total Time: minutes: 5-10 minutes  Note: not billable if this call serves to triage the patient into an appointment for the relevant concern    Assessment & Plan:   Diagnoses and all orders for this visit:      ICD-10-CM ICD-9-CM    1. Rash and nonspecific skin eruption  R21 782.1    2. PAD (peripheral artery disease) ([facility])  I73.9 443.9        1. Reviewed medications. 2.  To clarify alternative medication with vascular as reviewed. Follow-up and Dispositions    · Return if symptoms worsen or fail to improve. reviewed medications and side effects in detail    Plan and evaluation (above) reviewed with pt at visit  Patient voiced understanding of plan and provided with time to ask/review questions. After Visit Summary (AVS) provided to pt after visit with additional instructions as needed/reviewed. AVS:  []  Sent to patient as [employer] message after visit. []  Mailed to patient after visit. [x]  Not sent to patient after visit.   (No significant changes to prior plan from 10-15-20 virtual visit.)      Future Appointments   Date Time Provider Floridalma Bolton   12/3/2020 10:30 AM Heidi Vasquez MD [facility]

## 2020-10-20 NOTE — PROGRESS NOTES
Virtual Visit-telephone    Chief Complaint   Patient presents with    Rash     follow up      1. Have you been to the ER, urgent care clinic since your last visit? Hospitalized since your last visit? No    2. Have you seen or consulted any other health care providers outside of the [address] [place] [address] since your last visit? Include any pap smears or colon screening. No       Health Maintenance Due   Topic Date Due    Hepatitis C Screening  1950    DTaP/Tdap/Td series (1 - Tdap) 05/17/1969    Shingrix Vaccine Age 50> (1 of 2) 05/17/1998    FOBT Q1Y Age 50-75  05/17/1998    GLAUCOMA SCREENING Q2Y  05/17/2013    Pneumococcal 65+ years (1 of 1 - PPSV23) 05/17/2013    Flu Vaccine (1) 09/01/2020     Abuse Screening Questionnaire 10/20/2020   Do you ever feel afraid of your partner? N   Are you in a relationship with someone who physically or mentally threatens you? N   Is it safe for you to go home?  Y       3 most recent PHQ Screens 10/15/2020   Little interest or pleasure in doing things Not at all   Feeling down, depressed, irritable, or hopeless Not at all   Total Score PHQ 2 0   Trouble falling or staying asleep, or sleeping too much -   Feeling tired or having little energy -   Poor appetite, weight loss, or overeating -   Feeling bad about yourself - or that you are a failure or have let yourself or your family down -   Trouble concentrating on things such as school, work, reading, or watching TV -   Moving or speaking so slowly that other people could have noticed; or the opposite being so fidgety that others notice -   Thoughts of being better off dead, or hurting yourself in some way -   PHQ 9 Score -   How difficult have these problems made it for you to do your work, take care of your home and get along with others -

## 2020-10-23 ENCOUNTER — TELEPHONE (OUTPATIENT)
Dept: INTERNAL MEDICINE CLINIC | Age: 70
End: 2020-10-23

## 2020-10-23 NOTE — TELEPHONE ENCOUNTER
Pt states Zyrtec no longer works for his allergies, can Dr please Rx an alternate med. Please send to [facility] pharmacy on file.   Pt ph # [phone]

## 2020-10-30 ENCOUNTER — VIRTUAL VISIT (OUTPATIENT)
Dept: INTERNAL MEDICINE CLINIC | Age: 70
End: 2020-10-30

## 2020-10-30 DIAGNOSIS — R21 RASH: Primary | ICD-10-CM

## 2020-10-30 NOTE — PROGRESS NOTES
Reviewed with nursing during pt triage. Due to pt noting blistering rash, and possible eye/ocular involvement, recommended/reviewed that he should be seen as VV or in-office visit. Agree with plan for eval as per nursing note, as reviewed during pt \"rooming\" for telephone visit today.

## 2020-10-30 NOTE — PROGRESS NOTES
Telephone Visit    Identified pt with two pt identifiers(name and ). Reviewed record in preparation for visit and have obtained necessary documentation. All patient medications has been reviewed. Chief Complaint   Patient presents with    Rash     x2-3 weeks; 50% of body covered in blisters mostly upper body; weeping clear fluid; itching     During the rooming process of the patient and reviewing the symptoms for which he was needing to be seen for today determined that since he was unable to be seen via a virtual visit today (patient requested telephone visit due to cell phone not working) and unable to make it in to the office this morning for an in person visit that it may be best for him to attempt to go to and urgent care locally to have this issue physically assessed. Patient verbalized understanding and states that he will attempt to get a ride to go and be seen this afternoon or tomorrow at his local emergency department as he is unfamiliar with urgent cares in his areas.     Mierlla Persaud LPN

## 2020-11-30 DIAGNOSIS — L40.9 PSORIASIS: ICD-10-CM

## 2020-11-30 DIAGNOSIS — K27.9 PUD (PEPTIC ULCER DISEASE): ICD-10-CM

## 2020-11-30 DIAGNOSIS — K21.9 GASTROESOPHAGEAL REFLUX DISEASE WITHOUT ESOPHAGITIS: ICD-10-CM

## 2020-11-30 DIAGNOSIS — I10 ESSENTIAL HYPERTENSION: ICD-10-CM

## 2020-12-02 RX ORDER — PROPRANOLOL HYDROCHLORIDE 80 MG/1
80 TABLET ORAL 2 TIMES DAILY
Qty: 180 TAB | Refills: 1 | Status: SHIPPED | OUTPATIENT
Start: 2020-12-02 | End: 2021-02-28

## 2020-12-02 RX ORDER — PANTOPRAZOLE SODIUM 40 MG/1
TABLET, DELAYED RELEASE ORAL
Qty: 90 TAB | Refills: 1 | Status: SHIPPED | OUTPATIENT
Start: 2020-12-02 | End: 2021-05-11

## 2020-12-02 RX ORDER — HALOBETASOL PROPIONATE 0.5 MG/G
OINTMENT TOPICAL
Qty: 50 G | Refills: 1 | Status: SHIPPED | OUTPATIENT
Start: 2020-12-02 | End: 2021-02-28

## 2020-12-02 RX ORDER — AMLODIPINE BESYLATE 5 MG/1
TABLET ORAL
Qty: 90 TAB | Refills: 1 | Status: SHIPPED | OUTPATIENT
Start: 2020-12-02 | End: 2021-02-28

## 2020-12-02 RX ORDER — PROPRANOLOL HYDROCHLORIDE 40 MG/1
40 TABLET ORAL 2 TIMES DAILY
Qty: 180 TAB | Refills: 1 | Status: SHIPPED | OUTPATIENT
Start: 2020-12-02 | End: 2021-02-28

## 2020-12-03 ENCOUNTER — OFFICE VISIT (OUTPATIENT)
Dept: INTERNAL MEDICINE CLINIC | Age: 70
End: 2020-12-03
Payer: MEDICAID

## 2020-12-03 VITALS
SYSTOLIC BLOOD PRESSURE: 168 MMHG | BODY MASS INDEX: 26.28 KG/M2 | WEIGHT: 177.4 LBS | HEIGHT: 69 IN | OXYGEN SATURATION: 99 % | TEMPERATURE: 97.5 F | HEART RATE: 53 BPM | DIASTOLIC BLOOD PRESSURE: 78 MMHG | RESPIRATION RATE: 18 BRPM

## 2020-12-03 DIAGNOSIS — R21 RASH AND NONSPECIFIC SKIN ERUPTION: Primary | ICD-10-CM

## 2020-12-03 DIAGNOSIS — I73.9 PAD (PERIPHERAL ARTERY DISEASE) (HCC): ICD-10-CM

## 2020-12-03 DIAGNOSIS — K27.9 PUD (PEPTIC ULCER DISEASE): ICD-10-CM

## 2020-12-03 DIAGNOSIS — K21.9 GASTROESOPHAGEAL REFLUX DISEASE WITHOUT ESOPHAGITIS: ICD-10-CM

## 2020-12-03 DIAGNOSIS — L40.9 PSORIASIS: ICD-10-CM

## 2020-12-03 DIAGNOSIS — Z23 NEEDS FLU SHOT: ICD-10-CM

## 2020-12-03 DIAGNOSIS — Z23 ENCOUNTER FOR IMMUNIZATION: ICD-10-CM

## 2020-12-03 DIAGNOSIS — L29.9 ITCHING: ICD-10-CM

## 2020-12-03 DIAGNOSIS — G47.00 INSOMNIA, UNSPECIFIED TYPE: ICD-10-CM

## 2020-12-03 DIAGNOSIS — I10 ESSENTIAL HYPERTENSION: ICD-10-CM

## 2020-12-03 PROCEDURE — 90694 VACC AIIV4 NO PRSRV 0.5ML IM: CPT | Performed by: INTERNAL MEDICINE

## 2020-12-03 PROCEDURE — 90471 IMMUNIZATION ADMIN: CPT | Performed by: INTERNAL MEDICINE

## 2020-12-03 PROCEDURE — 99214 OFFICE O/P EST MOD 30 MIN: CPT | Performed by: INTERNAL MEDICINE

## 2020-12-03 PROCEDURE — 90732 PPSV23 VACC 2 YRS+ SUBQ/IM: CPT | Performed by: INTERNAL MEDICINE

## 2020-12-03 PROCEDURE — 90472 IMMUNIZATION ADMIN EACH ADD: CPT | Performed by: INTERNAL MEDICINE

## 2020-12-03 RX ORDER — HYDROXYZINE 25 MG/1
25-50 TABLET, FILM COATED ORAL
Qty: 50 TAB | Refills: 2 | Status: SHIPPED | OUTPATIENT
Start: 2020-12-03 | End: 2021-01-30

## 2020-12-03 RX ORDER — TRAZODONE HYDROCHLORIDE 50 MG/1
50 TABLET ORAL
Qty: 30 TAB | Refills: 2 | Status: SHIPPED | OUTPATIENT
Start: 2020-12-03 | End: 2021-01-30

## 2020-12-03 NOTE — PROGRESS NOTES
RM # 18  Would like the Flu vaccine today    Chief Complaint   Patient presents with    Blood Pressure Check     follow up    Allergies     Tylenol/pm. Patient believes this is what is causing the blisters all over body     3 most recent PHQ Screens 12/3/2020   Little interest or pleasure in doing things Not at all   Feeling down, depressed, irritable, or hopeless Not at all   Total Score PHQ 2 0   Trouble falling or staying asleep, or sleeping too much -   Feeling tired or having little energy -   Poor appetite, weight loss, or overeating -   Feeling bad about yourself - or that you are a failure or have let yourself or your family down -   Trouble concentrating on things such as school, work, reading, or watching TV -   Moving or speaking so slowly that other people could have noticed; or the opposite being so fidgety that others notice -   Thoughts of being better off dead, or hurting yourself in some way -   PHQ 9 Score -   How difficult have these problems made it for you to do your work, take care of your home and get along with others -         1. Have you been to the ER, urgent care clinic since your last visit? Hospitalized since your last visit? No    2. Have you seen or consulted any other health care providers outside of the [address] [place] [address] since your last visit? Include any pap smears or colon screening.  No    Health Maintenance Due   Topic Date Due    Hepatitis C Screening  1950    DTaP/Tdap/Td series (1 - Tdap) 05/17/1969    Shingrix Vaccine Age 50> (1 of 2) 05/17/1998    Colorectal Cancer Screening Combo  05/17/1998    GLAUCOMA SCREENING Q2Y  05/17/2013    Pneumococcal 65+ years (1 of 1 - PPSV23) 05/17/2013    Flu Vaccine (1) 09/01/2020       3 most recent PHQ Screens 12/3/2020   Little interest or pleasure in doing things Not at all   Feeling down, depressed, irritable, or hopeless Not at all   Total Score PHQ 2 0   Trouble falling or staying asleep, or sleeping too much - Feeling tired or having little energy -   Poor appetite, weight loss, or overeating -   Feeling bad about yourself - or that you are a failure or have let yourself or your family down -   Trouble concentrating on things such as school, work, reading, or watching TV -   Moving or speaking so slowly that other people could have noticed; or the opposite being so fidgety that others notice -   Thoughts of being better off dead, or hurting yourself in some way -   PHQ 9 Score -   How difficult have these problems made it for you to do your work, take care of your home and get along with others -     Fall Risk Assessment, last 12 mths 12/3/2020   Able to walk? Yes   Fall in past 12 months? No   Fall with injury? -   Number of falls in past 12 months -   Fall Risk Score -     Abuse Screening Questionnaire 12/3/2020   Do you ever feel afraid of your partner? N   Are you in a relationship with someone who physically or mentally threatens you? N   Is it safe for you to go home?  Y     ADL Assessment 12/3/2020   Feeding yourself No Help Needed   Getting from bed to chair No Help Needed   Getting dressed No Help Needed   Bathing or showering No Help Needed   Walk across the room (includes cane/walker) No Help Needed   Using the telphone No Help Needed   Taking your medications No Help Needed   Preparing meals No Help Needed   Managing money (expenses/bills) No Help Needed   Moderately strenuous housework (laundry) No Help Needed   Shopping for personal items (toiletries/medicines) No Help Needed   Shopping for groceries No Help Needed   Driving Help Needed   Climbing a flight of stairs No Help Needed   Getting to places beyond walking distances No Help Needed       Learning Assessment 8/7/2020   PRIMARY LEARNER Patient   BARRIERS PRIMARY LEARNER NONE   PRIMARY LANGUAGE ENGLISH   LEARNER PREFERENCE PRIMARY DEMONSTRATION     -     -     -   ANSWERED BY patient   RELATIONSHIP SELF       After obtaining consent, and per orders of Dr. Jorge Carmen, injection of Flu vaccine given by [facility]. Patient instructed to remain in clinic for 20 minutes afterwards, and to report any adverse reaction to me immediately. AVS  education, follow up, and recommendations provided and addressed with patient.   services used to advise patient no

## 2020-12-03 NOTE — PATIENT INSTRUCTIONS
1.  Please follow the following instructions to process/authorize your referral, if needed:    Referrals processing  Please verify with your insurance IF you need referral authorization submitted. For insurance plans which require this, please follow the following steps. FAILURE TO DO SO MAY RESULT IN INABILITY TO SEE THE SPECIALIST YOU HAVE BEEN REFERRED TO (once you are scheduled to see them). 1. Call and schedule appointment with specialist  2. Call our clinic and leave message with provider name, and date of appointment  3. We will then submit the referral to your insurance. This process takes 2-5 business days. If you have questions about scheduling or authorizing referral, you can review with our referral coordinator. You can review with her today if available/if you have time, or you can call to review once you have made your referral/appointment. If you are not sure if you need referral authorizations, please review with the referral coordinator(s), either prior to or after you have made the appointment, as reviewed. 2.  If you need help finding a dermatologist covered by your insurance    --You can call [facility], [facility], [id number] [facility] or [address] [facility], for dermatology evaluation      --You can also contact your insurance to see which providers are covered prior to calling for an appointment. The dermatologist(s) (in the group with [facility]) will only see patients with skin cancer.

## 2020-12-03 NOTE — TELEPHONE ENCOUNTER
Refill request(s) approved--halobetasol ointment, pantoprazola, propranolol (80mg + 40mg BID), amlodipine. Refill protocol details (computer-generated) reviewed, as available.

## 2020-12-03 NOTE — PROGRESS NOTES
History of Present Illness:   Teresa Ramirez is a 65 y.o. male here for evaluation:    Chief Complaint   Patient presents with    Blood Pressure Check     follow up    Allergies     Tylenol/pm. Patient believes this is what is causing the blisters all over body       Notes (nursing/rooming note copied below in italics): Would like the Flu vaccine today    He would also like to do PPSV-23 today also. He notes he had been taking Tylenol PM with diphenhydramine and concerned this is causing rash. Both are blue--one is a tablet; one is a capsule. BP Readings from Last 3 Encounters:   12/03/20 (!) 168/78   09/03/20 160/64   03/04/20 137/67       Nursing screenings reviewed by provider at visit. Prior to Admission medications    Medication Sig Start Date End Date Taking? Authorizing Provider   amLODIPine (NORVASC) 5 mg tablet TAKE 1 TABLET BY MOUTH EVERY DAY 12/2/20  Yes Emily Ramirez MD   pantoprazole (PROTONIX) 40 mg tablet TAKE 1 TABLET BY MOUTH EVERY DAY 12/2/20  Yes Emily Ramirez MD   propranoloL (INDERAL) 40 mg tablet TAKE 1 TAB BY MOUTH TWO (2) TIMES A DAY. TAKE WITH 80MG TAB, FOR 120MG TWO TIMES DAILY 12/2/20  Yes Emily Ramirez MD   propranoloL (INDERAL) 80 mg tablet TAKE 1 TAB BY MOUTH TWO (2) TIMES A DAY. TAKE WITH 40MG TAB, FOR 120MG TWO TIMES DAILY 12/2/20  Yes Emily Ramirez MD   halobetasol (ULTRAVATE) 0.05 % ointment APPLY TO AFFECTED AREA TWICE A DAY AVOID FACE, GROIN, AND ARMPITS 12/2/20  Yes Emily Ramirez MD   cetirizine (ZYRTEC) 10 mg tablet Take 1 Tab by mouth daily. 10/15/20  Yes Emily Ramirez MD   hydrOXYzine HCL (ATARAX) 25 mg tablet Take 1-2 Tabs by mouth every six (6) hours as needed for Itching. Use instead of Benadryl/diphenhydramine. 10/15/20  Yes Emily Ramirez MD   azelastine (ASTEPRO) 0.15 % (205.5 mcg) Use 1 to 2 sprays into each nostril two times a day as needed for allergies.  9/4/20  Yes Emily Ramirez MD   clopidogreL (PLAVIX) 75 mg tab Take 1 Tab by mouth daily. 7/17/20  Yes Nina Wilhelm MD   olopatadine (Patanase) 0.6 % spry 2 Squirts by Both Nostrils route as needed. Yes Provider, Historical   acetaminophen/diphenhydramine (TYLENOL PM PO) Take  by mouth as needed. Provider, Historical        ROS    Vitals:    12/03/20 1045 12/03/20 1136   BP: (!) 153/78 (!) 168/78   Pulse: (!) 53    Resp: 18    Temp: 97.5 °F (36.4 °C)    TempSrc: Oral    SpO2: 99%    Weight: 177 lb 6.4 oz (80.5 kg)    Height: 5' 9\" (1.753 m)    PainSc:   0 - No pain       Body mass index is 26.2 kg/m². Physical Exam:     Physical Exam  Vitals signs and nursing note reviewed. Constitutional:       General: He is not in acute distress. Appearance: Normal appearance. He is well-developed. He is not diaphoretic. HENT:      Head: Normocephalic and atraumatic. Mouth/Throat:      Mouth: Mucous membranes are moist.   Eyes:      General: No scleral icterus. Right eye: No discharge. Left eye: No discharge. Conjunctiva/sclera: Conjunctivae normal.   Cardiovascular:      Rate and Rhythm: Normal rate and regular rhythm. Pulses: Normal pulses. Heart sounds: Normal heart sounds. No murmur. No friction rub. No gallop. Pulmonary:      Effort: Pulmonary effort is normal. No respiratory distress. Breath sounds: Normal breath sounds. No stridor. No wheezing, rhonchi or rales. Abdominal:      General: Bowel sounds are normal. There is no distension. Palpations: Abdomen is soft. Tenderness: There is no abdominal tenderness. There is no guarding or rebound. Musculoskeletal:         General: No swelling, tenderness or deformity. Right lower leg: No edema. Left lower leg: No edema. Skin:     General: Skin is warm. Coloration: Skin is not jaundiced or pale. Findings: Rash present. No bruising or erythema.       Comments: Diffuse rash left dorsal wrist, upper chest midline. Neurological:      General: No focal deficit present. Mental Status: He is alert. Motor: No abnormal muscle tone. Coordination: Coordination normal.      Gait: Gait normal.   Psychiatric:         Mood and Affect: Mood normal.         Behavior: Behavior normal.         Thought Content: Thought content normal.         Judgment: Judgment normal.         Assessment and Plan:       ICD-10-CM ICD-9-CM    1. Rash and nonspecific skin eruption  R21 782.1 REFERRAL TO DERMATOLOGY   2. Psoriasis  L40.9 696.1 REFERRAL TO DERMATOLOGY   3. Itching  L29.9 698.9 hydrOXYzine HCL (ATARAX) 25 mg tablet   4. Encounter for immunization  Z23 V03.89 PNEUMOCOCCAL POLYSACCHARIDE VACCINE, 23-VALENT, ADULT OR IMMUNOSUPPRESSED PT DOSE,      ADMIN PNEUMOCOCCAL VACCINE   5. Needs flu shot  Z23 V04.81 FLU (FLUAD QUAD INFLUENZA VACCINE,QUAD,ADJUVANTED)   6. Essential hypertension  I10 401.9    7. PUD (peptic ulcer disease)  K27.9 533.90    8. Gastroesophageal reflux disease without esophagitis  K21.9 530.81    9. PAD (peripheral artery disease) ([facility])  I73.9 443.9    10. Insomnia, unspecified type  G47.00 780.52 traZODone (DESYREL) 50 mg tablet       1-3:  Referral(s) and referral coordination reviewed with patient at visit. Medication(s), management and follow-up based on response reviewed at visit. Symptomatic management reviewed at visit.    4,5:  Immunization(s) reviewed and updated at visit. Follow-up and Dispositions    · Return in about 3 months (around 3/3/2021), or if symptoms worsen or fail to improve, for Blood Pressure follow-up, referral follow-up--3-4mo, )non-fasting labs). lab results and schedule of future lab studies reviewed with patient  reviewed medications and side effects in detail    For additional documentation of information and/or recommendations discussed this visit, please see notes in instructions.       Plan and evaluation (above) reviewed with pt at visit  Patient voiced understanding of plan and provided with time to ask/review questions. After Visit Summary (AVS) provided to pt after visit with additional instructions as needed/reviewed. No future appointments.

## 2020-12-12 RX ORDER — CLOPIDOGREL BISULFATE 75 MG/1
TABLET ORAL
Qty: 90 TAB | Refills: 1 | Status: SHIPPED | OUTPATIENT
Start: 2020-12-12 | End: 2021-06-06

## 2021-01-18 DIAGNOSIS — L29.9 ITCHING: ICD-10-CM

## 2021-01-18 DIAGNOSIS — G47.00 INSOMNIA, UNSPECIFIED TYPE: ICD-10-CM

## 2021-01-30 RX ORDER — CETIRIZINE HCL 10 MG
10 TABLET ORAL
Qty: 30 TAB | Refills: 5 | Status: SHIPPED | OUTPATIENT
Start: 2021-01-30 | End: 2021-09-17

## 2021-01-30 RX ORDER — TRAZODONE HYDROCHLORIDE 50 MG/1
TABLET ORAL
Qty: 30 TAB | Refills: 5 | Status: SHIPPED | OUTPATIENT
Start: 2021-01-30 | End: 2021-07-06

## 2021-01-30 RX ORDER — HYDROXYZINE 25 MG/1
TABLET, FILM COATED ORAL
Qty: 50 TAB | Refills: 2 | Status: SHIPPED | OUTPATIENT
Start: 2021-01-30 | End: 2021-05-11

## 2021-02-22 DIAGNOSIS — L40.9 PSORIASIS: ICD-10-CM

## 2021-02-27 DIAGNOSIS — I10 ESSENTIAL HYPERTENSION: ICD-10-CM

## 2021-02-28 RX ORDER — PROPRANOLOL HYDROCHLORIDE 40 MG/1
TABLET ORAL
Qty: 180 TAB | Refills: 1 | Status: SHIPPED | OUTPATIENT
Start: 2021-02-28 | End: 2021-11-29

## 2021-02-28 RX ORDER — AMLODIPINE BESYLATE 5 MG/1
TABLET ORAL
Qty: 90 TAB | Refills: 1 | Status: SHIPPED | OUTPATIENT
Start: 2021-02-28

## 2021-02-28 RX ORDER — PROPRANOLOL HYDROCHLORIDE 80 MG/1
80 TABLET ORAL 2 TIMES DAILY
Qty: 180 TAB | Refills: 1 | Status: SHIPPED | OUTPATIENT
Start: 2021-02-28 | End: 2021-11-29

## 2021-02-28 RX ORDER — HALOBETASOL PROPIONATE 0.5 MG/G
OINTMENT TOPICAL
Qty: 50 G | Refills: 1 | Status: SHIPPED | OUTPATIENT
Start: 2021-02-28

## 2021-03-01 NOTE — TELEPHONE ENCOUNTER
Refill request(s) approved--propranolol (80mg + 40mg), amlodipine. Refill protocol details (computer-generated) reviewed, as available.

## 2021-03-02 ENCOUNTER — TELEPHONE (OUTPATIENT)
Dept: INTERNAL MEDICINE CLINIC | Age: 71
End: 2021-03-02

## 2021-03-02 DIAGNOSIS — L40.9 PSORIASIS: ICD-10-CM

## 2021-03-02 RX ORDER — HALOBETASOL PROPIONATE 0.5 MG/G
OINTMENT TOPICAL
Qty: 50 G | Refills: 1 | Status: CANCELLED | OUTPATIENT
Start: 2021-03-02

## 2021-03-02 NOTE — TELEPHONE ENCOUNTER
Dr. Phil Blood 0.05% is not covered under patients current insurance plan. Please review and prescribe alternate therapy or obtain a Prior Authorization. Please review. Thank you.      Requested Prescriptions     Pending Prescriptions Disp Refills    halobetasol (ULTRAVATE) 0.05 % ointment 50 g 1     Sig: use thin layer

## 2021-04-28 DIAGNOSIS — L29.9 ITCHING: ICD-10-CM

## 2021-05-08 DIAGNOSIS — K27.9 PUD (PEPTIC ULCER DISEASE): ICD-10-CM

## 2021-05-08 DIAGNOSIS — K21.9 GASTROESOPHAGEAL REFLUX DISEASE WITHOUT ESOPHAGITIS: ICD-10-CM

## 2021-05-11 RX ORDER — AZELASTINE HCL 205.5 UG/1
SPRAY NASAL
Qty: 30 ML | Refills: 5 | Status: SHIPPED | OUTPATIENT
Start: 2021-05-11

## 2021-05-11 RX ORDER — HYDROXYZINE 25 MG/1
TABLET, FILM COATED ORAL
Qty: 50 TAB | Refills: 2 | Status: SHIPPED | OUTPATIENT
Start: 2021-05-11 | End: 2021-09-17

## 2021-05-11 RX ORDER — PANTOPRAZOLE SODIUM 40 MG/1
TABLET, DELAYED RELEASE ORAL
Qty: 90 TAB | Refills: 1 | Status: SHIPPED | OUTPATIENT
Start: 2021-05-11 | End: 2021-11-29

## 2021-06-06 RX ORDER — CLOPIDOGREL BISULFATE 75 MG/1
TABLET ORAL
Qty: 90 TABLET | Refills: 1 | Status: SHIPPED | OUTPATIENT
Start: 2021-06-06

## 2021-06-26 DIAGNOSIS — G47.00 INSOMNIA, UNSPECIFIED TYPE: ICD-10-CM

## 2021-06-26 NOTE — LETTER
7/6/2021 12:52 AM        Mr. Fritz Prabhakar  [address] [place] [address]                Dear Mr. Gaitan Screen missed you! Please call our office at [phone] and schedule a follow up appointment for your continued care.       Sincerely,    Robert Leung MD

## 2021-07-06 RX ORDER — TRAZODONE HYDROCHLORIDE 50 MG/1
TABLET ORAL
Qty: 30 TABLET | Refills: 5 | Status: SHIPPED | OUTPATIENT
Start: 2021-07-06 | End: 2021-11-14

## 2021-09-15 DIAGNOSIS — L29.9 ITCHING: ICD-10-CM

## 2021-09-17 RX ORDER — HYDROXYZINE 25 MG/1
TABLET, FILM COATED ORAL
Qty: 50 TABLET | Refills: 2 | Status: SHIPPED | OUTPATIENT
Start: 2021-09-17 | End: 2021-11-29

## 2021-09-17 RX ORDER — CETIRIZINE HCL 10 MG
TABLET ORAL
Qty: 30 TABLET | Refills: 5 | Status: SHIPPED | OUTPATIENT
Start: 2021-09-17

## 2021-11-14 DIAGNOSIS — G47.00 INSOMNIA, UNSPECIFIED TYPE: ICD-10-CM

## 2021-11-14 RX ORDER — TRAZODONE HYDROCHLORIDE 50 MG/1
TABLET ORAL
Qty: 30 TABLET | Refills: 5 | Status: SHIPPED | OUTPATIENT
Start: 2021-11-14

## 2021-11-14 NOTE — LETTER
11/14/2021 11:48 PM        Taylor Hall Jimmyollie  [address]                Dear Mr. Prateek Grey missed you! Please call our office at [phone] and schedule a follow up appointment for your continued care.       Sincerely,    Patti Mc MD

## 2021-11-15 NOTE — TELEPHONE ENCOUNTER
Refill request(s) approved--trazodone. Please contact patient to schedule a follow-up appt. Letter to pt to schedule follow-up.

## 2021-11-28 DIAGNOSIS — L29.9 ITCHING: ICD-10-CM

## 2021-11-28 DIAGNOSIS — K27.9 PUD (PEPTIC ULCER DISEASE): ICD-10-CM

## 2021-11-28 DIAGNOSIS — I10 ESSENTIAL HYPERTENSION: ICD-10-CM

## 2021-11-28 DIAGNOSIS — K21.9 GASTROESOPHAGEAL REFLUX DISEASE WITHOUT ESOPHAGITIS: ICD-10-CM

## 2021-11-29 RX ORDER — PROPRANOLOL HYDROCHLORIDE 40 MG/1
TABLET ORAL
Qty: 180 TABLET | Refills: 0 | Status: SHIPPED | OUTPATIENT
Start: 2021-11-29

## 2021-11-29 RX ORDER — PROPRANOLOL HYDROCHLORIDE 80 MG/1
80 TABLET ORAL 2 TIMES DAILY
Qty: 180 TABLET | Refills: 0 | Status: SHIPPED | OUTPATIENT
Start: 2021-11-29

## 2021-11-29 RX ORDER — PANTOPRAZOLE SODIUM 40 MG/1
TABLET, DELAYED RELEASE ORAL
Qty: 90 TABLET | Refills: 0 | Status: SHIPPED | OUTPATIENT
Start: 2021-11-29 | End: 2022-01-08

## 2021-11-29 RX ORDER — HYDROXYZINE 25 MG/1
TABLET, FILM COATED ORAL
Qty: 50 TABLET | Refills: 2 | Status: SHIPPED | OUTPATIENT
Start: 2021-11-29

## 2021-11-29 NOTE — TELEPHONE ENCOUNTER
Refill request(s) approved--pantoprazole, propranolol (80mg + 40mg BID)--90-day supply with 0 refill(s). Refill request(s) approved--hydroxyzine--#50 with 2 refills, as prior Sept 2021. Please contact pt to schedule a follow-up appt.

## 2022-03-19 PROBLEM — I74.5 ILIAC ARTERY OCCLUSION, RIGHT (HCC): Status: ACTIVE | Noted: 2020-02-26

## 2022-03-19 PROBLEM — I73.9 PAD (PERIPHERAL ARTERY DISEASE) (HCC): Status: ACTIVE | Noted: 2017-06-08

## 2022-03-19 PROBLEM — I10 ESSENTIAL HYPERTENSION: Status: ACTIVE | Noted: 2018-05-22

## 2022-03-20 RX ORDER — CLOPIDOGREL BISULFATE 75 MG/1
TABLET ORAL
Qty: 90 TABLET | Refills: 1 | OUTPATIENT
Start: 2022-03-20

## 2023-05-24 RX ORDER — AZELASTINE HCL 205.5 UG/1
SPRAY NASAL
COMMUNITY
Start: 2021-05-11

## 2023-05-24 RX ORDER — OLOPATADINE HYDROCHLORIDE 665 UG/1
2 SPRAY NASAL PRN
COMMUNITY

## 2023-05-24 RX ORDER — PANTOPRAZOLE SODIUM 40 MG/1
1 TABLET, DELAYED RELEASE ORAL DAILY
COMMUNITY
Start: 2022-01-08

## 2023-05-24 RX ORDER — CLOPIDOGREL BISULFATE 75 MG/1
1 TABLET ORAL DAILY
COMMUNITY
Start: 2021-06-06

## 2023-05-24 RX ORDER — PROPRANOLOL HYDROCHLORIDE 40 MG/1
TABLET ORAL
COMMUNITY
Start: 2021-11-29

## 2023-05-24 RX ORDER — HALOBETASOL PROPIONATE 0.05 %
OINTMENT (GRAM) TOPICAL
COMMUNITY
Start: 2021-02-28

## 2023-05-24 RX ORDER — CETIRIZINE HYDROCHLORIDE 10 MG/1
TABLET ORAL
COMMUNITY
Start: 2021-09-17

## 2023-05-24 RX ORDER — HYDROXYZINE HYDROCHLORIDE 25 MG/1
TABLET, FILM COATED ORAL
COMMUNITY
Start: 2021-11-29

## 2023-05-24 RX ORDER — TRAZODONE HYDROCHLORIDE 50 MG/1
TABLET ORAL
COMMUNITY
Start: 2021-11-14

## 2023-05-24 RX ORDER — PROPRANOLOL HYDROCHLORIDE 80 MG/1
80 TABLET ORAL 2 TIMES DAILY
COMMUNITY
Start: 2021-11-29

## 2023-05-24 RX ORDER — AMLODIPINE BESYLATE 5 MG/1
1 TABLET ORAL DAILY
COMMUNITY
Start: 2021-02-28

## (undated) DEVICE — WATERPROOF, BACTERIA PROOF DRESSING WITH ABSORBENT SEE THROUGH PAD: Brand: OPSITE POST-OP VISIBLE 10X8CM CTN 20

## (undated) DEVICE — INFECTION CONTROL KIT SYS

## (undated) DEVICE — COVER,MAYO STAND,STERILE: Brand: MEDLINE

## (undated) DEVICE — GOWN,NON-REINFORCED,XXL: Brand: MEDLINE

## (undated) DEVICE — BLADE ASSEMB CLP HAIR FINE --

## (undated) DEVICE — VASCULAR-RICHMOND-LF: Brand: MEDLINE INDUSTRIES, INC.

## (undated) DEVICE — SUT SLK 3-0 30IN SH BLK --

## (undated) DEVICE — STRAP,POSITIONING,KNEE/BODY,FOAM,4X60": Brand: MEDLINE

## (undated) DEVICE — (D)PREP SKN CHLRAPRP APPL 26ML -- CONVERT TO ITEM 371833

## (undated) DEVICE — SUTURE GORTX SZ 4-0 L24IN NONABSORBABLE L13MM TTC-13 3/8 CIR 5K02A

## (undated) DEVICE — STAPLER SKIN H3.9MM WIRE DIA0.58MM CRWN 6.9MM 35 STPL ROT

## (undated) DEVICE — RING BASIN GUIDEWIRE BOWL: Brand: MEDLINE INDUSTRIES, INC.

## (undated) DEVICE — Device

## (undated) DEVICE — TOTAL TRAY, 16FR 10ML SIL FOLEY, URN: Brand: MEDLINE

## (undated) DEVICE — BLANKET WRM W25XL64IN NONWOVEN SFT LTWT PLIABLE HYPR

## (undated) DEVICE — REM POLYHESIVE ADULT PATIENT RETURN ELECTRODE: Brand: VALLEYLAB

## (undated) DEVICE — LABEL MED CARD MRMC STRL

## (undated) DEVICE — SUTURE VCRL SZ 2-0 L36IN ABSRB VLT L36MM CT-1 1/2 CIR J345H

## (undated) DEVICE — SYR 3ML LL TIP 1/10ML GRAD --

## (undated) DEVICE — PROBE VASC 8MHZ WTRPRF

## (undated) DEVICE — STERILE POLYISOPRENE POWDER-FREE SURGICAL GLOVES: Brand: PROTEXIS

## (undated) DEVICE — SOLUTION IV 500ML 0.9% SOD CHL FLX CONT

## (undated) DEVICE — AGENT HEMSTAT W4XL4IN OXIDIZED REGENERATED CELOS ABSRB SFT

## (undated) DEVICE — SYRINGE ANGIO CNTRST DEL 20 CC POLYCARB LIGHT GRN MEDALLION

## (undated) DEVICE — GUIDEWIRE URO L150CM DIA0.035IN TAPR 3CM NIT HYDRPHLC ANG

## (undated) DEVICE — 3M™ IOBAN™ 2 ANTIMICROBIAL INCISE DRAPE 6650EZ: Brand: IOBAN™ 2

## (undated) DEVICE — COVER,TABLE,60X90,STERILE: Brand: MEDLINE

## (undated) DEVICE — Z DISCONTINUED NO SUB IDED TAPE UMB W1/8XL36IN WHT COT STRND NONRADIOPAQUE

## (undated) DEVICE — NEEDLE HYPO 18GA L1.5IN PNK S STL HUB POLYPR SHLD REG BVL

## (undated) DEVICE — SUTURE VCRL SZ 3-0 L27IN ABSRB VLT L26MM SH 1/2 CIR J316H

## (undated) DEVICE — HANDLE LT SNAP ON ULT DURABLE LENS FOR TRUMPF ALC DISPOSABLE

## (undated) DEVICE — SOLUTION IV 1000ML 0.9% SOD CHL